# Patient Record
Sex: FEMALE | Race: WHITE | NOT HISPANIC OR LATINO | Employment: OTHER | URBAN - METROPOLITAN AREA
[De-identification: names, ages, dates, MRNs, and addresses within clinical notes are randomized per-mention and may not be internally consistent; named-entity substitution may affect disease eponyms.]

---

## 2023-06-05 LAB
ABO (EXTERNAL): NORMAL
C TRACH DNA SPEC QL PROBE+SIG AMP: NORMAL
GROUP B STREPTOCOCCUS (EXTERNAL): NEGATIVE
HEPATITIS B SURFACE ANTIGEN (EXTERNAL): NEGATIVE
HIV1+2 AB SERPL QL IA: NEGATIVE
N GONORRHOEA DNA SPEC QL PROBE+SIG AMP: NORMAL
RH (EXTERNAL): NEGATIVE
RUBELLA ANTIBODY IGG (EXTERNAL): NORMAL
SPECIMEN DESCRIP: NORMAL
SPECIMEN DESCRIPTION: NORMAL
TREPONEMA PALLIDUM ANTIBODY (EXTERNAL): NONREACTIVE
VDRL (SYPHILIS) (EXTERNAL): NONREACTIVE

## 2023-07-13 ENCOUNTER — HOSPITAL ENCOUNTER (INPATIENT)
Facility: CLINIC | Age: 23
LOS: 3 days | Discharge: HOME | End: 2023-07-16
Attending: FAMILY MEDICINE | Admitting: FAMILY MEDICINE
Payer: COMMERCIAL

## 2023-07-13 ENCOUNTER — TRANSFERRED RECORDS (OUTPATIENT)
Dept: HEALTH INFORMATION MANAGEMENT | Facility: CLINIC | Age: 23
End: 2023-07-13
Payer: COMMERCIAL

## 2023-07-13 DIAGNOSIS — D62 ANEMIA DUE TO BLOOD LOSS, ACUTE: ICD-10-CM

## 2023-07-13 DIAGNOSIS — Z98.891 S/P CESAREAN SECTION: ICD-10-CM

## 2023-07-13 DIAGNOSIS — O48.1 POST-TERM PREGNANCY BEYOND 42 WEEKS, ANTEPARTUM: ICD-10-CM

## 2023-07-13 PROBLEM — Z37.9 NORMAL LABOR: Status: ACTIVE | Noted: 2023-07-13

## 2023-07-13 LAB
ABO/RH(D): NORMAL
ANTIBODY SCREEN: NEGATIVE
HOLD SPECIMEN: NORMAL
SPECIMEN EXPIRATION DATE: NORMAL

## 2023-07-13 PROCEDURE — 86901 BLOOD TYPING SEROLOGIC RH(D): CPT | Performed by: FAMILY MEDICINE

## 2023-07-13 PROCEDURE — 86780 TREPONEMA PALLIDUM: CPT | Performed by: FAMILY MEDICINE

## 2023-07-13 PROCEDURE — 3E033VJ INTRODUCTION OF OTHER HORMONE INTO PERIPHERAL VEIN, PERCUTANEOUS APPROACH: ICD-10-PCS | Performed by: FAMILY MEDICINE

## 2023-07-13 PROCEDURE — 120N000001 HC R&B MED SURG/OB

## 2023-07-13 PROCEDURE — 85025 COMPLETE CBC W/AUTO DIFF WBC: CPT | Performed by: FAMILY MEDICINE

## 2023-07-13 PROCEDURE — 86850 RBC ANTIBODY SCREEN: CPT | Performed by: FAMILY MEDICINE

## 2023-07-13 RX ORDER — NALOXONE HYDROCHLORIDE 0.4 MG/ML
0.2 INJECTION, SOLUTION INTRAMUSCULAR; INTRAVENOUS; SUBCUTANEOUS
Status: DISCONTINUED | OUTPATIENT
Start: 2023-07-13 | End: 2023-07-14

## 2023-07-13 RX ORDER — OXYTOCIN/0.9 % SODIUM CHLORIDE 30/500 ML
1-24 PLASTIC BAG, INJECTION (ML) INTRAVENOUS CONTINUOUS
Status: DISCONTINUED | OUTPATIENT
Start: 2023-07-13 | End: 2023-07-14

## 2023-07-13 RX ORDER — METOCLOPRAMIDE 5 MG/1
10 TABLET ORAL EVERY 6 HOURS PRN
Status: DISCONTINUED | OUTPATIENT
Start: 2023-07-13 | End: 2023-07-14

## 2023-07-13 RX ORDER — SODIUM CHLORIDE, SODIUM LACTATE, POTASSIUM CHLORIDE, CALCIUM CHLORIDE 600; 310; 30; 20 MG/100ML; MG/100ML; MG/100ML; MG/100ML
INJECTION, SOLUTION INTRAVENOUS CONTINUOUS PRN
Status: DISCONTINUED | OUTPATIENT
Start: 2023-07-13 | End: 2023-07-14

## 2023-07-13 RX ORDER — KETOROLAC TROMETHAMINE 30 MG/ML
30 INJECTION, SOLUTION INTRAMUSCULAR; INTRAVENOUS
Status: DISCONTINUED | OUTPATIENT
Start: 2023-07-13 | End: 2023-07-14

## 2023-07-13 RX ORDER — ONDANSETRON 4 MG/1
4 TABLET, ORALLY DISINTEGRATING ORAL EVERY 6 HOURS PRN
Status: DISCONTINUED | OUTPATIENT
Start: 2023-07-13 | End: 2023-07-14

## 2023-07-13 RX ORDER — MISOPROSTOL 200 UG/1
400 TABLET ORAL
Status: DISCONTINUED | OUTPATIENT
Start: 2023-07-13 | End: 2023-07-14

## 2023-07-13 RX ORDER — PROCHLORPERAZINE 25 MG
25 SUPPOSITORY, RECTAL RECTAL EVERY 12 HOURS PRN
Status: DISCONTINUED | OUTPATIENT
Start: 2023-07-13 | End: 2023-07-14

## 2023-07-13 RX ORDER — TRANEXAMIC ACID 10 MG/ML
1 INJECTION, SOLUTION INTRAVENOUS EVERY 30 MIN PRN
Status: DISCONTINUED | OUTPATIENT
Start: 2023-07-13 | End: 2023-07-14

## 2023-07-13 RX ORDER — OXYTOCIN 10 [USP'U]/ML
10 INJECTION, SOLUTION INTRAMUSCULAR; INTRAVENOUS
Status: DISCONTINUED | OUTPATIENT
Start: 2023-07-13 | End: 2023-07-14

## 2023-07-13 RX ORDER — OXYTOCIN/0.9 % SODIUM CHLORIDE 30/500 ML
340 PLASTIC BAG, INJECTION (ML) INTRAVENOUS CONTINUOUS PRN
Status: DISCONTINUED | OUTPATIENT
Start: 2023-07-13 | End: 2023-07-14

## 2023-07-13 RX ORDER — ONDANSETRON 2 MG/ML
4 INJECTION INTRAMUSCULAR; INTRAVENOUS EVERY 6 HOURS PRN
Status: DISCONTINUED | OUTPATIENT
Start: 2023-07-13 | End: 2023-07-14

## 2023-07-13 RX ORDER — IBUPROFEN 800 MG/1
800 TABLET, FILM COATED ORAL
Status: DISCONTINUED | OUTPATIENT
Start: 2023-07-13 | End: 2023-07-14

## 2023-07-13 RX ORDER — METOCLOPRAMIDE HYDROCHLORIDE 5 MG/ML
10 INJECTION INTRAMUSCULAR; INTRAVENOUS EVERY 6 HOURS PRN
Status: DISCONTINUED | OUTPATIENT
Start: 2023-07-13 | End: 2023-07-14

## 2023-07-13 RX ORDER — METHYLERGONOVINE MALEATE 0.2 MG/ML
200 INJECTION INTRAVENOUS
Status: DISCONTINUED | OUTPATIENT
Start: 2023-07-13 | End: 2023-07-14

## 2023-07-13 RX ORDER — NALOXONE HYDROCHLORIDE 0.4 MG/ML
0.4 INJECTION, SOLUTION INTRAMUSCULAR; INTRAVENOUS; SUBCUTANEOUS
Status: DISCONTINUED | OUTPATIENT
Start: 2023-07-13 | End: 2023-07-14

## 2023-07-13 RX ORDER — PROCHLORPERAZINE MALEATE 5 MG
10 TABLET ORAL EVERY 6 HOURS PRN
Status: DISCONTINUED | OUTPATIENT
Start: 2023-07-13 | End: 2023-07-14

## 2023-07-13 RX ORDER — CITRIC ACID/SODIUM CITRATE 334-500MG
30 SOLUTION, ORAL ORAL
Status: COMPLETED | OUTPATIENT
Start: 2023-07-13 | End: 2023-07-14

## 2023-07-13 RX ORDER — OXYTOCIN/0.9 % SODIUM CHLORIDE 30/500 ML
100-340 PLASTIC BAG, INJECTION (ML) INTRAVENOUS CONTINUOUS PRN
Status: DISCONTINUED | OUTPATIENT
Start: 2023-07-13 | End: 2023-07-14

## 2023-07-13 RX ORDER — LIDOCAINE 40 MG/G
CREAM TOPICAL
Status: DISCONTINUED | OUTPATIENT
Start: 2023-07-13 | End: 2023-07-14

## 2023-07-13 RX ORDER — CARBOPROST TROMETHAMINE 250 UG/ML
250 INJECTION, SOLUTION INTRAMUSCULAR
Status: DISCONTINUED | OUTPATIENT
Start: 2023-07-13 | End: 2023-07-14

## 2023-07-13 ASSESSMENT — ACTIVITIES OF DAILY LIVING (ADL)
TOILETING_ISSUES: NO
WALKING_OR_CLIMBING_STAIRS_DIFFICULTY: NO
WEAR_GLASSES_OR_BLIND: NO
DRESSING/BATHING_DIFFICULTY: NO
ADLS_ACUITY_SCORE: 35
CONCENTRATING,_REMEMBERING_OR_MAKING_DECISIONS_DIFFICULTY: NO
DOING_ERRANDS_INDEPENDENTLY_DIFFICULTY: YES
ADLS_ACUITY_SCORE: 20
CHANGE_IN_FUNCTIONAL_STATUS_SINCE_ONSET_OF_CURRENT_ILLNESS/INJURY: NO
DIFFICULTY_EATING/SWALLOWING: NO
FALL_HISTORY_WITHIN_LAST_SIX_MONTHS: NO
ADLS_ACUITY_SCORE: 20

## 2023-07-14 ENCOUNTER — ANESTHESIA (OUTPATIENT)
Dept: OBGYN | Facility: CLINIC | Age: 23
End: 2023-07-14
Payer: COMMERCIAL

## 2023-07-14 ENCOUNTER — MEDICAL CORRESPONDENCE (OUTPATIENT)
Dept: HEALTH INFORMATION MANAGEMENT | Facility: CLINIC | Age: 23
End: 2023-07-14

## 2023-07-14 ENCOUNTER — ANESTHESIA EVENT (OUTPATIENT)
Dept: OBGYN | Facility: CLINIC | Age: 23
End: 2023-07-14
Payer: COMMERCIAL

## 2023-07-14 LAB
BASOPHILS # BLD AUTO: 0.1 10E3/UL (ref 0–0.2)
BASOPHILS NFR BLD AUTO: 0 %
EOSINOPHIL # BLD AUTO: 0.1 10E3/UL (ref 0–0.7)
EOSINOPHIL NFR BLD AUTO: 1 %
ERYTHROCYTE [DISTWIDTH] IN BLOOD BY AUTOMATED COUNT: 14.6 % (ref 10–15)
HCT VFR BLD AUTO: 35.2 % (ref 35–47)
HGB BLD-MCNC: 11.5 G/DL (ref 11.7–15.7)
IMM GRANULOCYTES # BLD: 0.2 10E3/UL
IMM GRANULOCYTES NFR BLD: 2 %
LYMPHOCYTES # BLD AUTO: 2.1 10E3/UL (ref 0.8–5.3)
LYMPHOCYTES NFR BLD AUTO: 13 %
MCH RBC QN AUTO: 27.9 PG (ref 26.5–33)
MCHC RBC AUTO-ENTMCNC: 32.7 G/DL (ref 31.5–36.5)
MCV RBC AUTO: 85 FL (ref 78–100)
MONOCYTES # BLD AUTO: 1 10E3/UL (ref 0–1.3)
MONOCYTES NFR BLD AUTO: 6 %
NEUTROPHILS # BLD AUTO: 12.9 10E3/UL (ref 1.6–8.3)
NEUTROPHILS NFR BLD AUTO: 78 %
NRBC # BLD AUTO: 0 10E3/UL
NRBC BLD AUTO-RTO: 0 /100
PLATELET # BLD AUTO: 295 10E3/UL (ref 150–450)
RBC # BLD AUTO: 4.12 10E6/UL (ref 3.8–5.2)
T PALLIDUM AB SER QL: NONREACTIVE
WBC # BLD AUTO: 16.4 10E3/UL (ref 4–11)

## 2023-07-14 PROCEDURE — 272N000001 HC OR GENERAL SUPPLY STERILE: Performed by: FAMILY MEDICINE

## 2023-07-14 PROCEDURE — 250N000009 HC RX 250: Performed by: FAMILY MEDICINE

## 2023-07-14 PROCEDURE — 250N000011 HC RX IP 250 OP 636: Performed by: NURSE ANESTHETIST, CERTIFIED REGISTERED

## 2023-07-14 PROCEDURE — 258N000003 HC RX IP 258 OP 636: Performed by: FAMILY MEDICINE

## 2023-07-14 PROCEDURE — C9290 INJ, BUPIVACAINE LIPOSOME: HCPCS | Performed by: NURSE ANESTHETIST, CERTIFIED REGISTERED

## 2023-07-14 PROCEDURE — 250N000009 HC RX 250: Performed by: NURSE ANESTHETIST, CERTIFIED REGISTERED

## 2023-07-14 PROCEDURE — 120N000001 HC R&B MED SURG/OB

## 2023-07-14 PROCEDURE — 250N000011 HC RX IP 250 OP 636: Mod: JZ | Performed by: FAMILY MEDICINE

## 2023-07-14 PROCEDURE — 250N000011 HC RX IP 250 OP 636: Mod: JZ | Performed by: NURSE ANESTHETIST, CERTIFIED REGISTERED

## 2023-07-14 PROCEDURE — 370N000017 HC ANESTHESIA TECHNICAL FEE, PER MIN: Performed by: FAMILY MEDICINE

## 2023-07-14 PROCEDURE — 59514 CESAREAN DELIVERY ONLY: CPT | Performed by: FAMILY MEDICINE

## 2023-07-14 PROCEDURE — 271N000001 HC OR GENERAL SUPPLY NON-STERILE: Performed by: FAMILY MEDICINE

## 2023-07-14 PROCEDURE — 250N000011 HC RX IP 250 OP 636: Performed by: FAMILY MEDICINE

## 2023-07-14 PROCEDURE — 360N000076 HC SURGERY LEVEL 3, PER MIN: Performed by: FAMILY MEDICINE

## 2023-07-14 PROCEDURE — 250N000013 HC RX MED GY IP 250 OP 250 PS 637: Performed by: FAMILY MEDICINE

## 2023-07-14 PROCEDURE — 710N000010 HC RECOVERY PHASE 1, LEVEL 2, PER MIN: Performed by: FAMILY MEDICINE

## 2023-07-14 RX ORDER — MORPHINE SULFATE 1 MG/ML
INJECTION, SOLUTION EPIDURAL; INTRATHECAL; INTRAVENOUS PRN
Status: DISCONTINUED | OUTPATIENT
Start: 2023-07-14 | End: 2023-07-14

## 2023-07-14 RX ORDER — ACETAMINOPHEN 325 MG/1
975 TABLET ORAL ONCE
Status: COMPLETED | OUTPATIENT
Start: 2023-07-14 | End: 2023-07-14

## 2023-07-14 RX ORDER — ONDANSETRON 2 MG/ML
4 INJECTION INTRAMUSCULAR; INTRAVENOUS EVERY 30 MIN PRN
Status: DISCONTINUED | OUTPATIENT
Start: 2023-07-14 | End: 2023-07-14

## 2023-07-14 RX ORDER — NALOXONE HYDROCHLORIDE 0.4 MG/ML
0.4 INJECTION, SOLUTION INTRAMUSCULAR; INTRAVENOUS; SUBCUTANEOUS
Status: DISCONTINUED | OUTPATIENT
Start: 2023-07-14 | End: 2023-07-16 | Stop reason: HOSPADM

## 2023-07-14 RX ORDER — LIDOCAINE HYDROCHLORIDE 20 MG/ML
INJECTION, SOLUTION INFILTRATION; PERINEURAL PRN
Status: DISCONTINUED | OUTPATIENT
Start: 2023-07-14 | End: 2023-07-14

## 2023-07-14 RX ORDER — FENTANYL CITRATE 50 UG/ML
25 INJECTION, SOLUTION INTRAMUSCULAR; INTRAVENOUS EVERY 5 MIN PRN
Status: DISCONTINUED | OUTPATIENT
Start: 2023-07-14 | End: 2023-07-16 | Stop reason: HOSPADM

## 2023-07-14 RX ORDER — BUPIVACAINE HYDROCHLORIDE 7.5 MG/ML
INJECTION, SOLUTION INTRASPINAL PRN
Status: DISCONTINUED | OUTPATIENT
Start: 2023-07-14 | End: 2023-07-14

## 2023-07-14 RX ORDER — BISACODYL 10 MG
10 SUPPOSITORY, RECTAL RECTAL DAILY PRN
Status: DISCONTINUED | OUTPATIENT
Start: 2023-07-16 | End: 2023-07-16 | Stop reason: HOSPADM

## 2023-07-14 RX ORDER — NALOXONE HYDROCHLORIDE 0.4 MG/ML
0.2 INJECTION, SOLUTION INTRAMUSCULAR; INTRAVENOUS; SUBCUTANEOUS
Status: DISCONTINUED | OUTPATIENT
Start: 2023-07-14 | End: 2023-07-16 | Stop reason: HOSPADM

## 2023-07-14 RX ORDER — ACETAMINOPHEN 325 MG/1
975 TABLET ORAL EVERY 6 HOURS
Status: DISCONTINUED | OUTPATIENT
Start: 2023-07-14 | End: 2023-07-14

## 2023-07-14 RX ORDER — ONDANSETRON 4 MG/1
4 TABLET, ORALLY DISINTEGRATING ORAL EVERY 6 HOURS PRN
Status: DISCONTINUED | OUTPATIENT
Start: 2023-07-14 | End: 2023-07-16 | Stop reason: HOSPADM

## 2023-07-14 RX ORDER — LIDOCAINE HYDROCHLORIDE 10 MG/ML
INJECTION, SOLUTION INFILTRATION; PERINEURAL PRN
Status: DISCONTINUED | OUTPATIENT
Start: 2023-07-14 | End: 2023-07-14

## 2023-07-14 RX ORDER — METHYLERGONOVINE MALEATE 0.2 MG/ML
200 INJECTION INTRAVENOUS
Status: DISCONTINUED | OUTPATIENT
Start: 2023-07-14 | End: 2023-07-16 | Stop reason: HOSPADM

## 2023-07-14 RX ORDER — CITRIC ACID/SODIUM CITRATE 334-500MG
30 SOLUTION, ORAL ORAL
Status: COMPLETED | OUTPATIENT
Start: 2023-07-14 | End: 2023-07-14

## 2023-07-14 RX ORDER — FENTANYL CITRATE-0.9 % NACL/PF 10 MCG/ML
PLASTIC BAG, INJECTION (ML) INTRAVENOUS CONTINUOUS PRN
Status: DISCONTINUED | OUTPATIENT
Start: 2023-07-14 | End: 2023-07-14

## 2023-07-14 RX ORDER — EPHEDRINE SULFATE 50 MG/ML
5 INJECTION, SOLUTION INTRAMUSCULAR; INTRAVENOUS; SUBCUTANEOUS
Status: DISCONTINUED | OUTPATIENT
Start: 2023-07-14 | End: 2023-07-14

## 2023-07-14 RX ORDER — OXYTOCIN/0.9 % SODIUM CHLORIDE 30/500 ML
340 PLASTIC BAG, INJECTION (ML) INTRAVENOUS CONTINUOUS PRN
Status: DISCONTINUED | OUTPATIENT
Start: 2023-07-14 | End: 2023-07-14

## 2023-07-14 RX ORDER — ONDANSETRON 2 MG/ML
4 INJECTION INTRAMUSCULAR; INTRAVENOUS EVERY 6 HOURS PRN
Status: DISCONTINUED | OUTPATIENT
Start: 2023-07-14 | End: 2023-07-16 | Stop reason: HOSPADM

## 2023-07-14 RX ORDER — DIPHENHYDRAMINE HCL 25 MG
25 CAPSULE ORAL EVERY 6 HOURS PRN
Status: DISCONTINUED | OUTPATIENT
Start: 2023-07-14 | End: 2023-07-16 | Stop reason: HOSPADM

## 2023-07-14 RX ORDER — AZITHROMYCIN 500 MG/1
500 INJECTION, POWDER, LYOPHILIZED, FOR SOLUTION INTRAVENOUS
Status: COMPLETED | OUTPATIENT
Start: 2023-07-14 | End: 2023-07-14

## 2023-07-14 RX ORDER — PROCHLORPERAZINE 25 MG
25 SUPPOSITORY, RECTAL RECTAL EVERY 12 HOURS PRN
Status: DISCONTINUED | OUTPATIENT
Start: 2023-07-14 | End: 2023-07-16 | Stop reason: HOSPADM

## 2023-07-14 RX ORDER — SIMETHICONE 80 MG
80 TABLET,CHEWABLE ORAL 4 TIMES DAILY PRN
Status: DISCONTINUED | OUTPATIENT
Start: 2023-07-14 | End: 2023-07-16 | Stop reason: HOSPADM

## 2023-07-14 RX ORDER — ONDANSETRON 2 MG/ML
INJECTION INTRAMUSCULAR; INTRAVENOUS PRN
Status: DISCONTINUED | OUTPATIENT
Start: 2023-07-14 | End: 2023-07-14

## 2023-07-14 RX ORDER — SODIUM CHLORIDE, SODIUM LACTATE, POTASSIUM CHLORIDE, CALCIUM CHLORIDE 600; 310; 30; 20 MG/100ML; MG/100ML; MG/100ML; MG/100ML
INJECTION, SOLUTION INTRAVENOUS CONTINUOUS
Status: DISCONTINUED | OUTPATIENT
Start: 2023-07-14 | End: 2023-07-14

## 2023-07-14 RX ORDER — ALBUTEROL SULFATE 0.83 MG/ML
2.5 SOLUTION RESPIRATORY (INHALATION) EVERY 4 HOURS PRN
Status: DISCONTINUED | OUTPATIENT
Start: 2023-07-14 | End: 2023-07-16 | Stop reason: HOSPADM

## 2023-07-14 RX ORDER — CARBOPROST TROMETHAMINE 250 UG/ML
250 INJECTION, SOLUTION INTRAMUSCULAR
Status: DISCONTINUED | OUTPATIENT
Start: 2023-07-14 | End: 2023-07-14

## 2023-07-14 RX ORDER — BUPIVACAINE HYDROCHLORIDE 2.5 MG/ML
INJECTION, SOLUTION EPIDURAL; INFILTRATION; INTRACAUDAL PRN
Status: DISCONTINUED | OUTPATIENT
Start: 2023-07-14 | End: 2023-07-14

## 2023-07-14 RX ORDER — DEXAMETHASONE SODIUM PHOSPHATE 10 MG/ML
INJECTION, SOLUTION INTRAMUSCULAR; INTRAVENOUS PRN
Status: DISCONTINUED | OUTPATIENT
Start: 2023-07-14 | End: 2023-07-14

## 2023-07-14 RX ORDER — OXYTOCIN 10 [USP'U]/ML
INJECTION, SOLUTION INTRAMUSCULAR; INTRAVENOUS PRN
Status: DISCONTINUED | OUTPATIENT
Start: 2023-07-14 | End: 2023-07-16 | Stop reason: HOSPADM

## 2023-07-14 RX ORDER — OXYTOCIN/0.9 % SODIUM CHLORIDE 30/500 ML
100-340 PLASTIC BAG, INJECTION (ML) INTRAVENOUS CONTINUOUS PRN
Status: DISCONTINUED | OUTPATIENT
Start: 2023-07-14 | End: 2023-07-16 | Stop reason: HOSPADM

## 2023-07-14 RX ORDER — LIDOCAINE 40 MG/G
CREAM TOPICAL
Status: DISCONTINUED | OUTPATIENT
Start: 2023-07-14 | End: 2023-07-16 | Stop reason: HOSPADM

## 2023-07-14 RX ORDER — CEFAZOLIN SODIUM/WATER 2 G/20 ML
2 SYRINGE (ML) INTRAVENOUS
Status: DISCONTINUED | OUTPATIENT
Start: 2023-07-14 | End: 2023-07-14

## 2023-07-14 RX ORDER — TRANEXAMIC ACID 10 MG/ML
1 INJECTION, SOLUTION INTRAVENOUS EVERY 30 MIN PRN
Status: DISCONTINUED | OUTPATIENT
Start: 2023-07-14 | End: 2023-07-16 | Stop reason: HOSPADM

## 2023-07-14 RX ORDER — AMOXICILLIN 250 MG
2 CAPSULE ORAL 2 TIMES DAILY
Status: DISCONTINUED | OUTPATIENT
Start: 2023-07-14 | End: 2023-07-16 | Stop reason: HOSPADM

## 2023-07-14 RX ORDER — DEXTROSE, SODIUM CHLORIDE, SODIUM LACTATE, POTASSIUM CHLORIDE, AND CALCIUM CHLORIDE 5; .6; .31; .03; .02 G/100ML; G/100ML; G/100ML; G/100ML; G/100ML
INJECTION, SOLUTION INTRAVENOUS CONTINUOUS
Status: DISCONTINUED | OUTPATIENT
Start: 2023-07-14 | End: 2023-07-16 | Stop reason: HOSPADM

## 2023-07-14 RX ORDER — PROCHLORPERAZINE MALEATE 5 MG
10 TABLET ORAL EVERY 6 HOURS PRN
Status: DISCONTINUED | OUTPATIENT
Start: 2023-07-14 | End: 2023-07-16 | Stop reason: HOSPADM

## 2023-07-14 RX ORDER — NALBUPHINE HYDROCHLORIDE 10 MG/ML
2.5-5 INJECTION, SOLUTION INTRAMUSCULAR; INTRAVENOUS; SUBCUTANEOUS EVERY 6 HOURS PRN
Status: DISCONTINUED | OUTPATIENT
Start: 2023-07-14 | End: 2023-07-14

## 2023-07-14 RX ORDER — FENTANYL CITRATE 50 UG/ML
50 INJECTION, SOLUTION INTRAMUSCULAR; INTRAVENOUS EVERY 5 MIN PRN
Status: DISCONTINUED | OUTPATIENT
Start: 2023-07-14 | End: 2023-07-16 | Stop reason: HOSPADM

## 2023-07-14 RX ORDER — CEFAZOLIN SODIUM/WATER 2 G/20 ML
2 SYRINGE (ML) INTRAVENOUS SEE ADMIN INSTRUCTIONS
Status: DISCONTINUED | OUTPATIENT
Start: 2023-07-14 | End: 2023-07-14

## 2023-07-14 RX ORDER — CARBOPROST TROMETHAMINE 250 UG/ML
250 INJECTION, SOLUTION INTRAMUSCULAR
Status: DISCONTINUED | OUTPATIENT
Start: 2023-07-14 | End: 2023-07-16 | Stop reason: HOSPADM

## 2023-07-14 RX ORDER — LIDOCAINE HYDROCHLORIDE AND EPINEPHRINE 15; 5 MG/ML; UG/ML
INJECTION, SOLUTION EPIDURAL PRN
Status: DISCONTINUED | OUTPATIENT
Start: 2023-07-14 | End: 2023-07-14

## 2023-07-14 RX ORDER — FENTANYL CITRATE 50 UG/ML
INJECTION, SOLUTION INTRAMUSCULAR; INTRAVENOUS PRN
Status: DISCONTINUED | OUTPATIENT
Start: 2023-07-14 | End: 2023-07-14

## 2023-07-14 RX ORDER — METHYLERGONOVINE MALEATE 0.2 MG/ML
200 INJECTION INTRAVENOUS
Status: DISCONTINUED | OUTPATIENT
Start: 2023-07-14 | End: 2023-07-14

## 2023-07-14 RX ORDER — DIPHENHYDRAMINE HYDROCHLORIDE 50 MG/ML
25 INJECTION INTRAMUSCULAR; INTRAVENOUS EVERY 6 HOURS PRN
Status: DISCONTINUED | OUTPATIENT
Start: 2023-07-14 | End: 2023-07-16 | Stop reason: HOSPADM

## 2023-07-14 RX ORDER — METOCLOPRAMIDE 5 MG/1
10 TABLET ORAL EVERY 6 HOURS PRN
Status: DISCONTINUED | OUTPATIENT
Start: 2023-07-14 | End: 2023-07-16 | Stop reason: HOSPADM

## 2023-07-14 RX ORDER — LIDOCAINE 40 MG/G
CREAM TOPICAL
Status: DISCONTINUED | OUTPATIENT
Start: 2023-07-14 | End: 2023-07-14

## 2023-07-14 RX ORDER — OXYCODONE HYDROCHLORIDE 5 MG/1
5 TABLET ORAL EVERY 4 HOURS PRN
Status: DISCONTINUED | OUTPATIENT
Start: 2023-07-14 | End: 2023-07-16 | Stop reason: HOSPADM

## 2023-07-14 RX ORDER — HYDROCORTISONE 25 MG/G
CREAM TOPICAL 3 TIMES DAILY PRN
Status: DISCONTINUED | OUTPATIENT
Start: 2023-07-14 | End: 2023-07-16 | Stop reason: HOSPADM

## 2023-07-14 RX ORDER — OXYTOCIN/0.9 % SODIUM CHLORIDE 30/500 ML
340 PLASTIC BAG, INJECTION (ML) INTRAVENOUS CONTINUOUS PRN
Status: DISCONTINUED | OUTPATIENT
Start: 2023-07-14 | End: 2023-07-16 | Stop reason: HOSPADM

## 2023-07-14 RX ORDER — OXYTOCIN 10 [USP'U]/ML
10 INJECTION, SOLUTION INTRAMUSCULAR; INTRAVENOUS
Status: DISCONTINUED | OUTPATIENT
Start: 2023-07-14 | End: 2023-07-16 | Stop reason: HOSPADM

## 2023-07-14 RX ORDER — MISOPROSTOL 200 UG/1
400 TABLET ORAL
Status: DISCONTINUED | OUTPATIENT
Start: 2023-07-14 | End: 2023-07-16 | Stop reason: HOSPADM

## 2023-07-14 RX ORDER — KETOROLAC TROMETHAMINE 30 MG/ML
30 INJECTION, SOLUTION INTRAMUSCULAR; INTRAVENOUS EVERY 6 HOURS
Status: COMPLETED | OUTPATIENT
Start: 2023-07-14 | End: 2023-07-15

## 2023-07-14 RX ORDER — PROPOFOL 10 MG/ML
INJECTION, EMULSION INTRAVENOUS PRN
Status: DISCONTINUED | OUTPATIENT
Start: 2023-07-14 | End: 2023-07-14

## 2023-07-14 RX ORDER — TRANEXAMIC ACID 10 MG/ML
1 INJECTION, SOLUTION INTRAVENOUS EVERY 30 MIN PRN
Status: DISCONTINUED | OUTPATIENT
Start: 2023-07-14 | End: 2023-07-14

## 2023-07-14 RX ORDER — OXYTOCIN 10 [USP'U]/ML
10 INJECTION, SOLUTION INTRAMUSCULAR; INTRAVENOUS
Status: DISCONTINUED | OUTPATIENT
Start: 2023-07-14 | End: 2023-07-14

## 2023-07-14 RX ORDER — OXYTOCIN/0.9 % SODIUM CHLORIDE 30/500 ML
PLASTIC BAG, INJECTION (ML) INTRAVENOUS CONTINUOUS PRN
Status: DISCONTINUED | OUTPATIENT
Start: 2023-07-14 | End: 2023-07-14

## 2023-07-14 RX ORDER — ONDANSETRON 4 MG/1
4 TABLET, ORALLY DISINTEGRATING ORAL EVERY 30 MIN PRN
Status: DISCONTINUED | OUTPATIENT
Start: 2023-07-14 | End: 2023-07-14

## 2023-07-14 RX ORDER — MODIFIED LANOLIN
OINTMENT (GRAM) TOPICAL
Status: DISCONTINUED | OUTPATIENT
Start: 2023-07-14 | End: 2023-07-16 | Stop reason: HOSPADM

## 2023-07-14 RX ORDER — IBUPROFEN 800 MG/1
800 TABLET, FILM COATED ORAL EVERY 6 HOURS
Status: DISCONTINUED | OUTPATIENT
Start: 2023-07-15 | End: 2023-07-16 | Stop reason: HOSPADM

## 2023-07-14 RX ORDER — ACETAMINOPHEN 325 MG/1
975 TABLET ORAL EVERY 6 HOURS
Status: DISCONTINUED | OUTPATIENT
Start: 2023-07-14 | End: 2023-07-16 | Stop reason: HOSPADM

## 2023-07-14 RX ORDER — MISOPROSTOL 200 UG/1
400 TABLET ORAL
Status: DISCONTINUED | OUTPATIENT
Start: 2023-07-14 | End: 2023-07-14

## 2023-07-14 RX ORDER — METOCLOPRAMIDE HYDROCHLORIDE 5 MG/ML
10 INJECTION INTRAMUSCULAR; INTRAVENOUS EVERY 6 HOURS PRN
Status: DISCONTINUED | OUTPATIENT
Start: 2023-07-14 | End: 2023-07-16 | Stop reason: HOSPADM

## 2023-07-14 RX ORDER — AMOXICILLIN 250 MG
1 CAPSULE ORAL 2 TIMES DAILY
Status: DISCONTINUED | OUTPATIENT
Start: 2023-07-14 | End: 2023-07-16 | Stop reason: HOSPADM

## 2023-07-14 RX ADMIN — BUPIVACAINE 5 ML: 13.3 INJECTION, SUSPENSION, LIPOSOMAL INFILTRATION at 12:31

## 2023-07-14 RX ADMIN — BUPIVACAINE 5 ML: 13.3 INJECTION, SUSPENSION, LIPOSOMAL INFILTRATION at 12:34

## 2023-07-14 RX ADMIN — ACETAMINOPHEN 975 MG: 325 TABLET, FILM COATED ORAL at 10:55

## 2023-07-14 RX ADMIN — MORPHINE SULFATE 0.2 MG: 1 INJECTION EPIDURAL; INTRATHECAL; INTRAVENOUS at 11:25

## 2023-07-14 RX ADMIN — Medication: at 02:00

## 2023-07-14 RX ADMIN — SENNOSIDES AND DOCUSATE SODIUM 1 TABLET: 8.6; 5 TABLET ORAL at 20:08

## 2023-07-14 RX ADMIN — KETOROLAC TROMETHAMINE 30 MG: 30 INJECTION, SOLUTION INTRAMUSCULAR; INTRAVENOUS at 20:08

## 2023-07-14 RX ADMIN — ONDANSETRON 4 MG: 2 INJECTION INTRAMUSCULAR; INTRAVENOUS at 11:42

## 2023-07-14 RX ADMIN — KETOROLAC TROMETHAMINE 30 MG: 30 INJECTION, SOLUTION INTRAMUSCULAR; INTRAVENOUS at 13:55

## 2023-07-14 RX ADMIN — SODIUM CHLORIDE, SODIUM LACTATE, POTASSIUM CHLORIDE, CALCIUM CHLORIDE AND DEXTROSE MONOHYDRATE: 5; 600; 310; 30; 20 INJECTION, SOLUTION INTRAVENOUS at 15:28

## 2023-07-14 RX ADMIN — PROPOFOL 30 MG: 10 INJECTION, EMULSION INTRAVENOUS at 11:43

## 2023-07-14 RX ADMIN — ACETAMINOPHEN 975 MG: 325 TABLET, FILM COATED ORAL at 23:11

## 2023-07-14 RX ADMIN — BUPIVACAINE HYDROCHLORIDE 8 ML: 2.5 INJECTION, SOLUTION EPIDURAL; INFILTRATION; INTRACAUDAL; PERINEURAL at 01:55

## 2023-07-14 RX ADMIN — LIDOCAINE HYDROCHLORIDE 10 ML: 20 INJECTION, SOLUTION INFILTRATION; PERINEURAL at 11:43

## 2023-07-14 RX ADMIN — LIDOCAINE HYDROCHLORIDE,EPINEPHRINE BITARTRATE 3 ML: 15; .005 INJECTION, SOLUTION EPIDURAL; INFILTRATION; INTRACAUDAL; PERINEURAL at 01:52

## 2023-07-14 RX ADMIN — DEXAMETHASONE SODIUM PHOSPHATE 5 MG: 10 INJECTION, SOLUTION INTRAMUSCULAR; INTRAVENOUS at 12:30

## 2023-07-14 RX ADMIN — BUPIVACAINE HYDROCHLORIDE 7 ML: 2.5 INJECTION, SOLUTION EPIDURAL; INFILTRATION; INTRACAUDAL at 12:30

## 2023-07-14 RX ADMIN — AZITHROMYCIN MONOHYDRATE 500 MG: 500 INJECTION, POWDER, LYOPHILIZED, FOR SOLUTION INTRAVENOUS at 10:45

## 2023-07-14 RX ADMIN — DEXAMETHASONE SODIUM PHOSPHATE 5 MG: 10 INJECTION, SOLUTION INTRAMUSCULAR; INTRAVENOUS at 12:34

## 2023-07-14 RX ADMIN — Medication: at 07:33

## 2023-07-14 RX ADMIN — BUPIVACAINE HYDROCHLORIDE 4 ML: 2.5 INJECTION, SOLUTION EPIDURAL; INFILTRATION; INTRACAUDAL; PERINEURAL at 02:07

## 2023-07-14 RX ADMIN — BUPIVACAINE HYDROCHLORIDE 7 ML: 2.5 INJECTION, SOLUTION EPIDURAL; INFILTRATION; INTRACAUDAL at 12:33

## 2023-07-14 RX ADMIN — DEXAMETHASONE SODIUM PHOSPHATE 5 MG: 10 INJECTION, SOLUTION INTRAMUSCULAR; INTRAVENOUS at 12:29

## 2023-07-14 RX ADMIN — SODIUM CHLORIDE, POTASSIUM CHLORIDE, SODIUM LACTATE AND CALCIUM CHLORIDE: 600; 310; 30; 20 INJECTION, SOLUTION INTRAVENOUS at 02:07

## 2023-07-14 RX ADMIN — Medication 2 MILLI-UNITS/MIN: at 05:18

## 2023-07-14 RX ADMIN — SODIUM CHLORIDE, POTASSIUM CHLORIDE, SODIUM LACTATE AND CALCIUM CHLORIDE 1000 ML: 600; 310; 30; 20 INJECTION, SOLUTION INTRAVENOUS at 01:07

## 2023-07-14 RX ADMIN — DEXAMETHASONE SODIUM PHOSPHATE 5 MG: 10 INJECTION, SOLUTION INTRAMUSCULAR; INTRAVENOUS at 12:33

## 2023-07-14 RX ADMIN — BUPIVACAINE 5 ML: 13.3 INJECTION, SUSPENSION, LIPOSOMAL INFILTRATION at 12:33

## 2023-07-14 RX ADMIN — BUPIVACAINE HYDROCHLORIDE IN DEXTROSE 1.5 ML: 7.5 INJECTION, SOLUTION SUBARACHNOID at 11:25

## 2023-07-14 RX ADMIN — SODIUM CITRATE AND CITRIC ACID MONOHYDRATE 30 ML: 500; 334 SOLUTION ORAL at 10:55

## 2023-07-14 RX ADMIN — BUPIVACAINE HYDROCHLORIDE 8 ML: 2.5 INJECTION, SOLUTION EPIDURAL; INFILTRATION; INTRACAUDAL at 12:29

## 2023-07-14 RX ADMIN — LIDOCAINE HYDROCHLORIDE 10 ML: 10 INJECTION, SOLUTION INFILTRATION; PERINEURAL at 10:51

## 2023-07-14 RX ADMIN — Medication 340 ML/HR: at 11:48

## 2023-07-14 RX ADMIN — SODIUM CHLORIDE, POTASSIUM CHLORIDE, SODIUM LACTATE AND CALCIUM CHLORIDE 500 ML: 600; 310; 30; 20 INJECTION, SOLUTION INTRAVENOUS at 10:45

## 2023-07-14 RX ADMIN — ACETAMINOPHEN 975 MG: 325 TABLET, FILM COATED ORAL at 17:50

## 2023-07-14 RX ADMIN — Medication 2 G: at 11:15

## 2023-07-14 RX ADMIN — Medication 50 MCG/MIN: at 11:25

## 2023-07-14 RX ADMIN — BUPIVACAINE 5 ML: 13.3 INJECTION, SUSPENSION, LIPOSOMAL INFILTRATION at 12:30

## 2023-07-14 RX ADMIN — BUPIVACAINE HYDROCHLORIDE 8 ML: 2.5 INJECTION, SOLUTION EPIDURAL; INFILTRATION; INTRACAUDAL at 12:34

## 2023-07-14 RX ADMIN — FENTANYL CITRATE 15 MCG: 50 INJECTION, SOLUTION INTRAMUSCULAR; INTRAVENOUS at 11:25

## 2023-07-14 RX ADMIN — SODIUM CITRATE AND CITRIC ACID MONOHYDRATE 30 ML: 500; 334 SOLUTION ORAL at 11:00

## 2023-07-14 RX ADMIN — LIDOCAINE HYDROCHLORIDE 5 ML: 20 INJECTION, SOLUTION INFILTRATION; PERINEURAL at 02:27

## 2023-07-14 ASSESSMENT — ACTIVITIES OF DAILY LIVING (ADL)
ADLS_ACUITY_SCORE: 20
ADLS_ACUITY_SCORE: 23
ADLS_ACUITY_SCORE: 20

## 2023-07-14 NOTE — PROGRESS NOTES
"Progress note:  Subjective: Patient has been on Pitocin since 5 AM without any significant cervical change.  She had a max dose of 10 milliunits/min continuous drip and that was back down to 8 milliunits/min continuous drip due to hyperstimulation.  Dr. Mac had checked her at 4 AM this morning and she was 9 cm with very slow progression.  She was 6 cm when she appeared scented staff had checked her at 8 PM on July 13.  Nursing staff checked her at 715 this morning and she had a thick anterior rim at a -2 station.  Dr. Mac had thought that the baby was over a just due to the weight was filling the pelvis but was not certain why the labor progress was so slow.  Fetal heart rate has been in the 140s with moderate variability and nice accelerations, category 1 monitor strip.    Objective:  /84   Pulse 96   Temp 97.6  F (36.4  C) (Oral)   Resp 16   Ht 1.651 m (5' 5\")   Wt 88 kg (194 lb)   LMP 10/10/2022 (Approximate)   SpO2 93%   BMI 32.28 kg/m      Exam: Cervical exam shows her to be still with a fairly tight anterior lip from the 10 o'clock position all the way over to the 3 o'clock position.  The baby had dropped to a 0 to +1 station with contractions but it was very tight cervix so I was unable to to reduce it over the baby's head.  With trying to reduce the cervix the baby had a significant deceleration into the 90s that lasted for 3 minutes.  With the next contraction I tried the same thing and again had a deceleration into the upper 90s to low 100s for 2 to 3 minutes.  Since that was attempted, the strip has changed.  She is having some variable and even slightly late decelerations after each contraction, fetal heart rate is in the 140s with minimal variability.  This is now category 2 monitor strip.    Assessment:  23-year-old primiparous patient at 42-1/7 weeks gestation with a prolonged second phase of first stage of labor now stuck with an anterior rim for more than 3 hours.    Fetal " heart rate is changed going from a category 1 strip to category 2 with minimal variability and recurrent variable D cells with some late in timing.    Plan:  I spoke with the couple and her family about the risk of continuing trying to labor her.  She has had a very prolonged active phase of labor with minimal change so I am concerned that there is some sort of dystocia with either pelvis being too small or fetus being too big.  I did do an ultrasound at the bedside which showed the baby's spine on maternal left so the baby is in an old a presentation with the occiput on maternal left so CELESTE.    I discussed primary  as mode of delivery and the patient couple are agreeable to this.  They do want to do some delayed cord clamping for at least 60 to 90 seconds on the operating room table.  If baby is stable we can then bring the baby to the warmer dried off and then bring baby over to mom for mom to hold the baby  Once we are ready to do the tap block for her pain management after the  is completed we can take baby with dad to the recovery room where they can do some Koala care there with skin to skin.    Risks of the  have been discussed already with Dr. Mac including blood loss, infection, injury to bowel bladder and other internal, or risk of injury to the baby.  Average blood loss with a primary  is between 500 to 1000 cc.  If hemorrhage happens and there is need blood products the risk for tainted blood is very minimal.  I will consent her for the  and blood products.    Electronically signed by:  Isaiah Castañeda M.D.  2023

## 2023-07-14 NOTE — PROGRESS NOTES
"RN accepted care at 0700. Patient uncomfortable and feeling \"pushy\", SVE requested by patient. SVE 9.5/100%/-2. RN discussed position changes and patient open to ideas. Side lying release done on right and left side. Straight Cath'd for 500ml. Patient placed on left side with peanut ball per request. Pitcoin was increased to 10, and then decreased back down to 8 within 30 minutes d/t frequent contractions. VSS. Patient placed into throne position at 0820 and tolerating this.  "

## 2023-07-14 NOTE — H&P
[facility] Labor and Delivery History and Physical    Mariya Mehta MRN# [id number]   Age: 23 year old YOB: 2000     Date of Admission:  2023    Primary care provider: No Ref-Primary, Physician           Chief Complaint:   Mariya Mehta is a 23 year old female who is 42w pregnant and being admitted for failure to enter into labor.  I was called by our local midwife requesting transfer.  She has been using various methods to try to induce labor was unsuccessful.  She was ruptured at 2 PM today, 5 hours prior to admission, but no resulting labor.  She arrived here by private transport.  She reports feeling well.  She is starting to feel more uncomfortable with contractions.  The fluid was clear at rupture.  She is denies fever.  Denies prenatal issues such as diabetes and hypertension.  This is her first pregnancy.  She is hoping to go naturally but is open to the idea of using nitrous if needed.    Prenatal records from the midwifery center reviewed.  There was an 8 out of 8 biophysical last week on .  Vertex position at that time.  GBS negative.  Third trimester hemoglobin 11.  Glucola 115.  Blood type O-.  Unclear if she was given RhoGAM.  Hepatitis C nonreactive.  HIV negative.  Hep B negative.  Rubella nonimmune.  40 pound weight gain.          Pregnancy history:     OBSTETRIC HISTORY:    OB History    Para Term  AB Living   1 0 0 0 0 0   SAB IAB Ectopic Multiple Live Births   0 0 0 0 0      # Outcome Date GA Lbr Morgan/2nd Weight Sex Delivery Anes PTL Lv   1 Current                EDC: Estimated Date of Delivery: 2023    Prenatal Labs: No results found for: ABO, RH, AS, HEPBANG, CHPCRT, GCPCRT, TREPAB, RUBELLAABIGG, HGB, HIV    GBS Status:   Lab Results   Component Value Date    GBS Negative 2023       Active Problem List  Patient Active Problem List   Diagnosis    Normal labor       Medication Prior to Admission  Medications Prior to  "Admission   Medication Sig Dispense Refill Last Dose    Magnesium Citrate 83 MG CHEW Take 166 mg by mouth daily   Unknown    Prenatal MV-Min-Fe Fum-FA-DHA (PRENATAL 1 PO)    7/12/2023 at 0900   .        Maternal Past Medical History:     Past Medical History:   Diagnosis Date    Anxiety                        Family History:   I have reviewed this patient's family history and commented on sigificant items within the HPI            Social History:   I have reviewed this patient's social history and commented on significant items within the HPI         Review of Systems:   The Review of Systems is negative other than noted in the HPI          Physical Exam:   Temp: 97.7  F (36.5  C) Temp src: Oral BP: 135/68 Pulse: 98   Resp: 18 SpO2: 97 % O2 Device: None (Room air)    Patient Vitals for the past 8 hrs:   BP Temp Temp src Pulse Resp SpO2 Height Weight   07/13/23 2110 -- 97.7  F (36.5  C) Oral -- -- -- -- --   07/13/23 1945 135/68 98.3  F (36.8  C) Oral 98 18 97 % 1.651 m (5' 5\") 88 kg (194 lb)       gen - well appearing  CV - RRR  Lungs: CTAB  Abd- gravid, non tender    Extremities:   Warm, well perfused  Edema absent     Cervix:   Membranes: ruptured   Dilation: 6   Effacement:    Station:   Consistency:    Position:   Presentation:Cephalic  Fetal Heart Rate Tracing: reactive and reassuring  Tocometer: external monitor and inadequate     EFW: 7.5-8 lb                 Assessment:   Mariya Mehta is a 39w3d pregnant female admitted with postdates and needing induction.  We discussed options at this point.  She agrees to starting Pitocin.  She would like some pain control with nitrous.  We discussed some of her birth plan.  She is comfortable moving forward.  Her boyfriend's questions were answered to her satisfaction..          Plan:   Start Pitocin  GBS negative  RhoGAM testing needed after delivery  Rubella nonimmune  Pain control with nitrous  Anticipating vaginal delivery        Frederic Mac MD   "

## 2023-07-14 NOTE — PROGRESS NOTES
S: Shift review  B:April is a , day of  birth.  A: Stable post-op, incision is CDI, Lung sounds-clear, bowel sounds active in all 4 quadrants, , pain control achieved with tap block, p.o. pain tylenol and IV toradol. Handles baby with confidence.  Pt's epidural has been slow to wear off.  Was able to sit at edge of bed at 1815 for 10 minutes.  Pt did not feel comfortable trying to ambulate at that time.    Eating regular diet diet and tolerating well.    R: Continue with plan of care. Offer pain meds routinely.

## 2023-07-14 NOTE — PROGRESS NOTES
Updated by RN. Epidural in. Cat 1 strip. 9 cm. Slow progress. Ansvd. Patient urged to start pit, but not yet ready. rh

## 2023-07-14 NOTE — PROGRESS NOTES
Nitrous set up and pt educated on use. Pt states it is helping with the pain. Pts. Christina is present as well as her S.O. and her Mother.

## 2023-07-14 NOTE — PROGRESS NOTES
Talked with patient at bedside. 9cm still. Swollen cervix. Suspect we're not engaging pelvis well. Patient willing to start pit at 5a, and hour ago. Had been resistant to this idea all night. Long discussion about labor dystocia and causes. Discussed the option of section at any time, but especially if on pit and no change with 4-5 hrs. I believe baby to be OA, but possibly OP the way things are slow to progress. EFW 7-8 lbs. Gave expectation that we should make a delivery decision by 11-12 latest, as long as baby is tolerating. Baby is post dates.     rh

## 2023-07-14 NOTE — ANESTHESIA PROCEDURE NOTES
"Epidural catheter Procedure Note    Pre-Procedure   Staff -        Performed By: CRNA       Location: OB       Pre-Anesthestic Checklist: patient identified, IV checked, risks and benefits discussed, informed consent, monitors and equipment checked, pre-op evaluation and at physician/surgeon's request  Timeout:       Correct Patient: Yes        Correct Procedure: Yes        Correct Site: Yes        Correct Position: Yes   Procedure Documentation  Procedure: epidural catheter       Patient Position: sitting       Patient Prep/Sterile Barriers: sterile gloves, mask, patient draped       Skin prep: Betadine       Local skin infiltrated with 3 mL of 1% lidocaine.        Insertion Site: L3-4. (midline approach).       Technique: LORT air        Needle Type: Toyassine needle and Fernandes       Needle Gauge: 17.        Needle Length (Inches): 3.5        Catheter: 19 G.          Catheter threaded easily.         4 cm epidural space.           # of attempts: 1 and  # of redirects:  0    Assessment/Narrative         Paresthesias: No.       Test dose of 3 mL lidocaine 1.5% w/ 1:200,000 epinephrine at.         Test dose negative, 3 minutes after injection, for signs of intravascular, subdural, or intrathecal injection.       Insertion/Infusion Method: LORT air       Aspiration negative for Heme or CSF via Epidural Catheter.       Sensory Level Left: T6.       Sensory Level Right: T6.     Comments:  Pt appeared to tolerate procedure well.  Denied paraesthesias during placement.  There was a negative reaction to the test dose for intrathecal, subdural or intravascular injection.  Pt had an appropriate physiological response to the loading dose.  Will be available if further intervention is required.        FOR [facility] ([facility]) ONLY:   Pain Team Contact information: please page the Pain Team Via [employer]. Search \"Pain\". During daytime hours, please page the attending first. At night please page the resident first.      "

## 2023-07-14 NOTE — ANESTHESIA PREPROCEDURE EVALUATION
Anesthesia Pre-Procedure Evaluation    Patient: Mariya Mehta   MRN: [id number] : 2000        Procedure : * No procedures listed *          Past Medical History:   Diagnosis Date    Anxiety       Past Surgical History:   Procedure Laterality Date    wisdom teeth removal        No Known Allergies   Social History     Tobacco Use    Smoking status: Never    Smokeless tobacco: Never    Tobacco comments:     Vaping intermittently during pregnancy   Substance Use Topics    Alcohol use: Not Currently      Wt Readings from Last 1 Encounters:   23 88 kg (194 lb)        Anesthesia Evaluation   Pt has not had prior anesthetic         ROS/MED HX  ENT/Pulmonary:  - neg pulmonary ROS     Neurologic:  - neg neurologic ROS     Cardiovascular:  - neg cardiovascular ROS     METS/Exercise Tolerance:     Hematologic:  - neg hematologic  ROS     Musculoskeletal:       GI/Hepatic:  - neg GI/hepatic ROS     Renal/Genitourinary:       Endo:  - neg endo ROS     Psychiatric/Substance Use:  - neg psychiatric ROS     Infectious Disease:       Malignancy:       Other:            Physical Exam    Airway        Mallampati: II   TM distance: > 3 FB   Neck ROM: full   Mouth opening: > 3 cm    Respiratory Devices and Support         Dental  no notable dental history         Cardiovascular   cardiovascular exam normal          Pulmonary   pulmonary exam normal                OUTSIDE LABS:  CBC:   Lab Results   Component Value Date    WBC 16.4 (H) 2023    HGB 11.5 (L) 2023    HCT 35.2 2023     2023     BMP: No results found for: NA, POTASSIUM, CHLORIDE, CO2, BUN, CR, GLC  COAGS: No results found for: PTT, INR, FIBR  POC: No results found for: BGM, HCG, HCGS  HEPATIC: No results found for: ALBUMIN, PROTTOTAL, ALT, AST, GGT, ALKPHOS, BILITOTAL, BILIDIRECT, MEHUL  OTHER: No results found for: PH, LACT, A1C, PAIGE, PHOS, MAG, LIPASE, AMYLASE, TSH, T4, T3, CRP, SED    Anesthesia Plan    ASA Status:  2        Anesthesia Type: Epidural.              Consents    Anesthesia Plan(s) and associated risks, benefits, and realistic alternatives discussed. Questions answered and patient/representative(s) expressed understanding.     - Discussed:     - Discussed with:  Patient            Postoperative Care            Comments:    Other Comments: The risks and benefits of anesthesia, and the alternatives where applicable, have been discussed with the patient, and they wish to proceed.    If epidural deemed ineffective will pull EC and perform Spinal for the .       neg OB ROS.       Rizwan Whitehead, APRN CRNA

## 2023-07-14 NOTE — ANESTHESIA PROCEDURE NOTES
"Intrathecal Procedure Note    Pre-Procedure   Staff -        CRNA: Britton Lombardo APRN CRNA       Performed By: CRNA       Location: OR       Pre-Anesthestic Checklist: patient identified, IV checked, risks and benefits discussed, informed consent, monitors and equipment checked and pre-op evaluation  Timeout:       Correct Patient: Yes        Correct Procedure: Yes        Correct Site: Yes        Correct Position: Yes   Procedure Documentation  Procedure: intrathecal       Diagnosis: Failure to progress       Patient Position: sitting       Patient Prep/Sterile Barriers: sterile gloves, mask, patient draped       Skin prep: Betadine       Insertion Site: L3-4. (midline approach).       Needle Gauge: 25.        Needle Length (Inches): 3.5        Spinal Needle Type: Pencan       Introducer used       Introducer: 20 G       # of attempts: 1 and  # of redirects:  1    Assessment/Narrative         Paresthesias: No.       CSF fluid: clear.       Opening pressure was cmH2O while  Sitting.        FOR [facility] ([facility]) ONLY:   Pain Team Contact information: please page the Pain Team Via [employer]. Search \"Pain\". During daytime hours, please page the attending first. At night please page the resident first.      "

## 2023-07-14 NOTE — ANESTHESIA PROCEDURE NOTES
TAP Procedure Note    Pre-Procedure   Staff -        CRNA: Britton Lombardo APRN CRNA       Performed By: CRNA       Location: OR       Pre-Anesthestic Checklist: patient identified, IV checked, site marked, risks and benefits discussed, informed consent, monitors and equipment checked, pre-op evaluation, at physician/surgeon's request and post-op pain management  Timeout:       Correct Patient: Yes        Correct Procedure: Yes        Correct Site: Yes        Correct Position: Yes        Correct Laterality: Yes        Site Marked: Yes  Procedure Documentation  Procedure: TAP       Diagnosis: SP ABDOMINAL INCISION-NEED FOR ANALGESIA       Laterality: bilateral       Patient Position: supine       Patient Prep/Sterile Barriers: sterile gloves, mask       Skin prep: Chloraprep       Needle Type: insulated       Needle Gauge: 22.        Needle Length (millimeters): 100        Ultrasound guided       1. Ultrasound was used to identify targeted nerve, plexus, vascular marker, or fascial plane and place a needle adjacent to it in real-time.       2. Ultrasound was used to visualize the spread of anesthetic in close proximity to the above referenced structure.       3. A permanent image is entered into the patient's record.       4. The visualized anatomic structures appeared normal.       5. There were no apparent abnormal pathologic findings.    Assessment/Narrative         The placement was negative for: blood aspirated, painful injection and site bleeding       Paresthesias: No.       Test dose of mL at.         Test dose negative, 3 minutes after injection, for signs of intravascular, subdural, or intrathecal injection.       Bolus given via needle..        Secured via.        Insertion/Infusion Method: Single Shot       Complications: none       Injection made incrementally with aspirations every 5 mL.     Comments:  Pt tolerated the procedure well as under General Anesthesia.  There was good visualization of the space  "between the internal oblique and the transverses abdominis.  There was also good visualization of fluid dissection in this layer.  No complications were noted.  I will follow up with this pt if needed.      FOR [facility] ([facility]) ONLY:   Pain Team Contact information: please page the Pain Team Via [employer]. Search \"Pain\". During daytime hours, please page the attending first. At night please page the resident first.      "

## 2023-07-14 NOTE — PROGRESS NOTES
SVE per provider remains anterior lip and +1 station. Patient prepped for  after discussion with provider.

## 2023-07-14 NOTE — OR NURSING
Verbal report received from Yolis SANDHU RN and Britton KEANE. Phase 1 recovery assumed by Kassandra BOSCH. Pt post-op spinal for  per .

## 2023-07-14 NOTE — PROGRESS NOTES
Dr. Mac called and informed of pts arrival, status report given. He will place orders and come to see pt.

## 2023-07-14 NOTE — PROGRESS NOTES
S: Transfer to postpartum  B:  birth @ 1146, breastfeeding    A: Mother and baby transferred to postpartum unit at 1330 via bed after completion of immediate recovery period. Patient oriented to room. Mother and baby bonding well and in satisfactory condition upon transfer.  R: Anticipate routine postpartum care.

## 2023-07-14 NOTE — ANESTHESIA CARE TRANSFER NOTE
Patient: April Tierra Mehta    Procedure: Procedure(s):   SECTION       Diagnosis: Failure to progress in labor [O62.2]  Post-term pregnancy beyond 42 weeks, antepartum [O48.1]  Diagnosis Additional Information: No value filed.    Anesthesia Type:   Epidural     Note:    Oropharynx: spontaneously breathing  Level of Consciousness: awake  Oxygen Supplementation: room air    Independent Airway: airway patency satisfactory and stable    Vital Signs Stable: post-procedure vital signs reviewed and stable  Report to RN Given: handoff report given  Patient transferred to: PACU    Handoff Report: Identifed the Patient, Identified the Reponsible Provider, Reviewed the pertinent medical history, Discussed the surgical course, Reviewed Intra-OP anesthesia mangement and issues during anesthesia, Set expectations for post-procedure period and Allowed opportunity for questions and acknowledgement of understanding    Vitals:  Vitals Value Taken Time   /84 23 1250   Temp     Pulse 99 23 1250   Resp     SpO2 96 % 23 1252   Vitals shown include unvalidated device data.    Electronically Signed By: CAMI Anderson CRNA  2023  12:52 PM

## 2023-07-14 NOTE — L&D DELIVERY NOTE
PREOPERATIVE DIAGNOSES:   1. Postdates intrauterine pregnancy at 42 1/7 weeks' gestation.   2. Failure to dilate in active phase of labor     POSTOPERATIVE DIAGNOSIS:   1. Postdates intrauterine pregnancy at 42 1/7 weeks' gestation.   2. Failure to dilate in active phase of labor    PROCEDURE: Primary low transverse  section.     SURGEON: Garry   PRIMARY: Itzel Verma Lay midwife  ASSISTANT: Estefania Mac was asked to assist in the  Section because of the special skill set he/she possess in Myotomy repair, fascial closure, and special skin closure techniques.  Dr. Mac was a vital assistant in all of the above listed procedures. His/Her skills allowed us to shorten the length of the procedure considerably, which has been shown to decrease risk of infection, decrease post operative morbidity, and improve outcomes.     ANESTHESIA: Epidural then Spinal with Britton Lombardo CRNA.     INDICATIONS: PrimiparousMultiparous patient who is at 42 1/7 weeks' gestation. Please see my consult or EPIC note for details. This patient had a failed labor augmentation at  42 1/7 weeks' gestation. See her PNs in [facility] for details of her labor.    PROCEDURE: April was taken to the operating room where spinal anesthesia was placed after her epidural bolus failed to give adequate anesthesia. She was then laid in the supine position and placed in stirrups due to the station of the baby, where she was prepped and draped in the routine sterile fashion after a Beal catheter was placed. Under adequate anesthesia, a Pfannenstiel incision was made with a #15 blade. The incision was extended down through the subcutaneous tissue with blunt and sharp dissection, cauterizing any bleeding as we went. The midline of the fascia was scored with a #15 blade and carried transversely in both directions with a curved Mishra scissors. The fascia was then dissected off the anterior surface of the rectus muscle inferiorly and  superiorly with both sharp and blunt dissection. Again, any bleeding was cauterized and taken care of at the time. The rectus muscles were then  in the midline and entered with sharp dissection. This was then extended with blunt dissection in both directions until adequate space was appreciated. A bladder flap was created by tenting the uterovesicular peritoneum with DeBakeys and incising with a Metzenbaum scissors. The Troy O self-retaining retractor was then inserted. With good exposure, we then incised the uterus with a #15 blade, carefully getting down to the final layer. This was then broken through with a Alissa and then the uterine incision was extended with finger dissection in both directions transversely. The presenting part was identified and noted to be quite low in the pelvis.  The labor nurse with sterile gloves, elevated the fetal head vaginally while the fetal head was grasped from above.  Once the suction was broken the head was then delivered through the abdominal incision atraumatically. The shoulders and remainder of the body were then delivered and spontaneous crying and respirations were noted with this viable . Suctioning through the mouth and nares was done at this time. The cord was clamped x2 and cut and the baby brought over to the warming table and nursing staff by the assistant. Cord bloods were then obtained.     The placenta was then removed manually intact. The cut surface of the uterus was then isolated with Tirado clamps. The uterine incision was closed using 0 Vicryl in a running locked fashion, followed by a second imbricating layer of 0 Monocryl. Any bleeding was controlled with figure-of-eight sutures. Hemostasis was excellent.     The bladder flap was not repaired.     Irrigation was then done to remove any blood clots and debris. On reinspection, the uterine scar was dry and clear of any bleeding.     The parietal peritoneum was then closed using 0 Vicryl in a  running fashion. The rectus muscles were brought together to the midline with 0 Vicryl interrupted sutures.     The corners of the fascia on either end were isolated with Kocher clamps. The fascia was then closed with 0 Vicryl in a running fashion. The subq layer was closed by using   3 interrupted sutures of 2-0 plain catgut suture. The skin was then closed with Insorb resorbable staples and sealed with Exofin glue.    VARIATIONS: None .     ESTIMATED BLOOD LOSS: 355 mL.     TOTAL FLUIDS IN: 450 ml of LR.     TOTAL URINE OUTPUT: About 300 mL of clear urine.     Both mom and this male  with apgars of 7 and 9 at 1 and 5 min respectively, which weighed 8 pounds 1 ounces or 3655 grams were stable upon me leaving the delivery room.     OPERATING ROOM TIMES:   Into the operating room at 1117, time of incision 1139, time of delivery 1146, and time of closure 1216.     Electronically signed by:  Isaiah Castañeda M.D.  2023

## 2023-07-14 NOTE — PLAN OF CARE
Goal Outcome Evaluation:      Problem: Plan of Care - These are the overarching goals to be used throughout the patient stay.    Goal: Absence of Hospital-Acquired Illness or Injury  Intervention: Prevent Skin Injury  Recent Flowsheet Documentation  Taken 7/13/2023 1940 by Mini Beauchamp RN  Body Position: position changed independently  Goal: Optimal Comfort and Wellbeing  Intervention: Provide Person-Centered Care  Recent Flowsheet Documentation  Taken 7/13/2023 1940 by Mini Beauchamp RN  Trust Relationship/Rapport:   care explained   choices provided   emotional support provided   empathic listening provided   questions answered   questions encouraged   reassurance provided   thoughts/feelings acknowledged  Goal: Readiness for Transition of Care  Recent Flowsheet Documentation  Taken 7/13/2023 2020 by Mini Beauchamp RN  Anticipated Changes Related to Illness: none  Concerns to be Addressed: no discharge needs identified  Intervention: Mutually Develop Transition Plan  Recent Flowsheet Documentation  Taken 7/13/2023 2020 by Mini Beauchamp RN  Anticipated Changes Related to Illness: none  Concerns to be Addressed: no discharge needs identified  Taken 7/13/2023 2016 by Mini Beauchamp RN  Equipment Currently Used at Home: none     Problem: Labor  Goal: Stable Fetal Wellbeing  Intervention: Promote and Monitor Fetal Wellbeing  Recent Flowsheet Documentation  Taken 7/13/2023 1940 by Mini Beauchamp RN  Body Position: position changed independently

## 2023-07-14 NOTE — PROGRESS NOTES
S:  Section Delivery  B: Primary  Section  @ 1146  A: Baby delivered, cord clamping delayed for 60-90 seconds, then brought to pre- warmed infant warmer. Infant stimulated and dried. Infant then brought to mother and placed skin to skin for bonding. Apgars 7/9. Educated mother on expected feeding readiness cues and encouraged her to observe for feeding cues. Mother informed that breast feeding assistance would be provided.   R: Mother and baby bonding well. Anticipate first feed within the hour.

## 2023-07-14 NOTE — PROGRESS NOTES
Pitocin was ordered and this RN went to begin the Pitocin. The patient requested that I check her before starting. SVE 7/80/-2. Pt is refusing Pitocin at this time. Dr. Mac informed.

## 2023-07-14 NOTE — OR NURSING
Transfer from   330 PACU to Room 361  Transferred via bed.      S: 24 y/o female  S/P        Anesthesia Type:  spinal       Surgeon:  Dr. Castañeda       Allergies:  See Medication Reconciliation Record       DNR: NO      B:  Pertinent Medical History:   Past Medical History:   Diagnosis Date     Anxiety                 Surgical History:    Past Surgical History:   Procedure Laterality Date     wisdom teeth removal         A:  EBL: 340 ml        IVF:  450 ml        UOP:  300 ml        NPO:  ___Yes _x__No; ice chips         Vomiting:  ___Yes _x__No; denied c/o nausea         Drainage: sm.amt. rubra noted        Skin Integrity: normal x/ incision          RFO: _x__Yes___No; patel cath        SSI Patient?  ___Yes___No (if yes, see checklist for actions)        Brace/sling/equipment:  _x__Yes___No; pneumoboots       See PACU record for ongoing assessment, vital signs and pain assessment.    R: Post-Op vitals and assessments as ordered/indicated per patient's condition.       Follow Post-Op orders and notify Physician prn.       Continue to involve patient/family in plan of care and discharge planning.       Reinforce Pre-Operative education.       Implement skin safety interventions as appropriate.  Verbal report given to Rosie BOSCH OB    Name: Kassandra Blanco PACU HIRO

## 2023-07-14 NOTE — PROGRESS NOTES
Patient currently still in throne position per request. Tolerating contractions, regular pattern. Cat I strip.

## 2023-07-15 LAB
ABO/RH(D): NORMAL
FETAL BLEED SCREEN: NORMAL
HGB BLD-MCNC: 9.7 G/DL (ref 11.7–15.7)
SPECIMEN EXPIRATION DATE: NORMAL

## 2023-07-15 PROCEDURE — 86901 BLOOD TYPING SEROLOGIC RH(D): CPT | Performed by: FAMILY MEDICINE

## 2023-07-15 PROCEDURE — 3E0234Z INTRODUCTION OF SERUM, TOXOID AND VACCINE INTO MUSCLE, PERCUTANEOUS APPROACH: ICD-10-PCS | Performed by: FAMILY MEDICINE

## 2023-07-15 PROCEDURE — 90471 IMMUNIZATION ADMIN: CPT | Performed by: FAMILY MEDICINE

## 2023-07-15 PROCEDURE — 36415 COLL VENOUS BLD VENIPUNCTURE: CPT | Performed by: FAMILY MEDICINE

## 2023-07-15 PROCEDURE — 99231 SBSQ HOSP IP/OBS SF/LOW 25: CPT | Performed by: FAMILY MEDICINE

## 2023-07-15 PROCEDURE — 250N000013 HC RX MED GY IP 250 OP 250 PS 637: Performed by: FAMILY MEDICINE

## 2023-07-15 PROCEDURE — 90707 MMR VACCINE SC: CPT | Performed by: FAMILY MEDICINE

## 2023-07-15 PROCEDURE — 250N000011 HC RX IP 250 OP 636: Mod: JZ | Performed by: FAMILY MEDICINE

## 2023-07-15 PROCEDURE — 120N000001 HC R&B MED SURG/OB

## 2023-07-15 PROCEDURE — 85018 HEMOGLOBIN: CPT | Performed by: FAMILY MEDICINE

## 2023-07-15 RX ADMIN — IBUPROFEN 800 MG: 800 TABLET, FILM COATED ORAL at 14:51

## 2023-07-15 RX ADMIN — KETOROLAC TROMETHAMINE 30 MG: 30 INJECTION, SOLUTION INTRAMUSCULAR; INTRAVENOUS at 02:00

## 2023-07-15 RX ADMIN — ACETAMINOPHEN 975 MG: 325 TABLET, FILM COATED ORAL at 23:22

## 2023-07-15 RX ADMIN — ACETAMINOPHEN 975 MG: 325 TABLET, FILM COATED ORAL at 04:55

## 2023-07-15 RX ADMIN — SENNOSIDES AND DOCUSATE SODIUM 2 TABLET: 50; 8.6 TABLET ORAL at 20:45

## 2023-07-15 RX ADMIN — ACETAMINOPHEN 975 MG: 325 TABLET, FILM COATED ORAL at 11:09

## 2023-07-15 RX ADMIN — SENNOSIDES AND DOCUSATE SODIUM 1 TABLET: 8.6; 5 TABLET ORAL at 08:57

## 2023-07-15 RX ADMIN — ACETAMINOPHEN 975 MG: 325 TABLET, FILM COATED ORAL at 17:52

## 2023-07-15 RX ADMIN — HUMAN RHO(D) IMMUNE GLOBULIN 300 MCG: 1500 SOLUTION INTRAMUSCULAR; INTRAVENOUS at 10:11

## 2023-07-15 RX ADMIN — IBUPROFEN 800 MG: 800 TABLET, FILM COATED ORAL at 08:57

## 2023-07-15 RX ADMIN — MEASLES, MUMPS, AND RUBELLA VIRUS VACCINE LIVE 0.5 ML: 1000; 12500; 1000 INJECTION, POWDER, LYOPHILIZED, FOR SUSPENSION SUBCUTANEOUS at 11:10

## 2023-07-15 RX ADMIN — IBUPROFEN 800 MG: 800 TABLET, FILM COATED ORAL at 20:46

## 2023-07-15 ASSESSMENT — ACTIVITIES OF DAILY LIVING (ADL)
ADLS_ACUITY_SCORE: 20

## 2023-07-15 NOTE — PLAN OF CARE
Goal Outcome Evaluation:    Incision WDL. Ice applied to incision intermittently. Not using abdominal binder previously given. Pt reports pain is controlled with scheduled pain medications. Rhogam and MMR given today. Patient ambulating frequently. Voids today of 150ml and 400ml with adequate oral intake. Denies HA, dizziness, nausea. VSS. Patient independent with personal cares. Encouragement needed with each feeding session; RN provided encouragement for use of nipple shield and nipple everter. Pt reports she has used this but it does not change feeding sessions. RN encouraged supplementing infant with previously expressed colostrum by syringe - father went to  from home today.

## 2023-07-15 NOTE — PROGRESS NOTES
[facility] Obstetrics Post-Op / Progress Note         Assessment and Plan:    Assessment:   Post-operative day #1  Low transverse primary  section  L&D complications: Intrauterine pregnancy at 42 1/4 weeks gestation  Post-dates  Primary  section      Doing well.  Clean wound without signs of infection.  Normal healing wound.  No immediate surgical complications identified.  No excessive bleeding  Pain well-controlled.      Plan:   Ambulation encouraged  Breast feeding strategies discussed  Reportable signs and symptoms dicussed with the patient  RhoGam given  Start iron supplementation  Anticipate discharge tomorrow           Interval History:   Doing well.  Pain is well-controlled.  No fevers.  No history of wound drainage, warmth or significant erythema.  Good appetite.  Denies chest pain, shortness of breath, nausea or vomiting.  Ambulatory.  Breastfeeding well.          Significant Problems:    None          Review of Systems:    The patient denies any chest pain, shortness of breath, excessive pain, fever, chills, purulent drainage from the wound, nausea or vomiting.          Medications:   All medications related to the patient's surgery have been reviewed          Physical Exam:   All vitals stable  Patient Vitals for the past 8 hrs:   BP Temp Temp src Pulse Resp SpO2   07/15/23 0456 119/47 -- -- 100 17 97 %   07/15/23 0206 105/43 97.9  F (36.6  C) Oral 80 15 96 %     LUNGS:  Clear to auscultation bilaterally, no wheezing, rhonci or rales.  CV:  RRR nl S1/S2 no murmur.   Wound clean and dry with minimal or no drainage.  Surrounding skin with minimal erythema.  Extremities: No edema.           Data:   All laboratory data related to this surgery reviewed  Hemoglobin   Date Value Ref Range Status   07/15/2023 9.7 (L) 11.7 - 15.7 g/dL Final   2023 11.5 (L) 11.7 - 15.7 g/dL Final     No imaging studies have been ordered    Electronically signed by:  Isaiah Castañeda  M.D.  7/15/2023          DISPLAY PLAN FREE TEXT

## 2023-07-15 NOTE — PROGRESS NOTES
Patient was up with stand by assist to bathroom at 2045, patel removed, tony cares. Patient then ambulated around her room and tolerated well. IV remains saline locked. VSS. Fundus firm at U with light bleeding. Assisted with positioning baby to breast, nipple everter and nipple shield for baby to latch deep. Patient's SO at bedside and very supportive. Reviewed plan for the night. Reassurance and listening provided.

## 2023-07-15 NOTE — PLAN OF CARE
Goal Outcome Evaluation:    17 hours post primary  for failure to dilate. VSS. BPs have been low 100s/40s-50s; had elevated BPs yesterday. No clonus, no HA, no change of vision. Dependent bilateral edema +1. Incision well approximated, no drainage, using ice packs for comfort. Abdominal binder given, patient declines use at this time. Fundus firm at U, light bleeding. Pain being managed with scheduled tylenol and toradol. IV remains saline locked. Patient educated on Rhogam workup and rhophylac. Tolerating regular diet. Encouraging increased water intake. Patient has voided twice thus far since patel removal at  yesterday evening; amounts of 100ml and 150ml with dark mary urine. Patient emptying bladder with voids. Assisted with breastfeeding. Using nipple everter and at times nipple shield. Able to latch baby with football hold on right side and cross cradle for left side. Gaining confidence in cares for self and baby.

## 2023-07-15 NOTE — ANESTHESIA POSTPROCEDURE EVALUATION
Patient: Mariya Mehta    Procedure: Procedure(s):   SECTION       Anesthesia Type:  Epidural    Note:  Disposition: Inpatient   Postop Pain Control: Uneventful            Sign Out: Well controlled pain   PONV: No   Neuro/Psych: Uneventful            Sign Out: Acceptable/Baseline neuro status   Airway/Respiratory: Uneventful            Sign Out: Acceptable/Baseline resp. status   CV/Hemodynamics: Uneventful            Sign Out: Acceptable CV status   Other NRE: NONE   DID A NON-ROUTINE EVENT OCCUR? No    Event details/Postop Comments:   Pt doing well, no anesthesia concerns noted/stated, full motor/sensory function lower extremities, pt satisfied with anesthesia care, pt aware to have Rn call anesthesia if any future questions/concerns come up.      Epidural-to- Updated ASA: 2      Last vitals:  Vitals Value Taken Time   /89 23 1315   Temp 98  F (36.7  C) 23 1315   Pulse 102 23 1315   Resp 16 23 1315   SpO2 95 % 23 1320       Electronically Signed By: CAMI Ragland CRNA  2023  7:10 PM

## 2023-07-16 VITALS
DIASTOLIC BLOOD PRESSURE: 59 MMHG | HEIGHT: 65 IN | SYSTOLIC BLOOD PRESSURE: 129 MMHG | OXYGEN SATURATION: 97 % | TEMPERATURE: 97.6 F | WEIGHT: 194 LBS | BODY MASS INDEX: 32.32 KG/M2 | HEART RATE: 85 BPM | RESPIRATION RATE: 15 BRPM

## 2023-07-16 PROCEDURE — 99238 HOSP IP/OBS DSCHRG MGMT 30/<: CPT | Performed by: FAMILY MEDICINE

## 2023-07-16 PROCEDURE — 250N000013 HC RX MED GY IP 250 OP 250 PS 637: Performed by: FAMILY MEDICINE

## 2023-07-16 RX ORDER — FERROUS GLUCONATE 324(38)MG
324 TABLET ORAL
Qty: 14 TABLET | Refills: 0 | Status: SHIPPED | OUTPATIENT
Start: 2023-07-16 | End: 2023-07-30

## 2023-07-16 RX ORDER — IBUPROFEN 800 MG/1
800 TABLET, FILM COATED ORAL EVERY 6 HOURS
Qty: 90 TABLET | Refills: 1 | Status: SHIPPED | OUTPATIENT
Start: 2023-07-16 | End: 2024-02-26

## 2023-07-16 RX ORDER — ACETAMINOPHEN 500 MG
1000 TABLET ORAL EVERY 6 HOURS
Start: 2023-07-16 | End: 2024-02-26

## 2023-07-16 RX ADMIN — IBUPROFEN 800 MG: 800 TABLET, FILM COATED ORAL at 08:56

## 2023-07-16 RX ADMIN — ACETAMINOPHEN 975 MG: 325 TABLET, FILM COATED ORAL at 06:18

## 2023-07-16 RX ADMIN — IBUPROFEN 800 MG: 800 TABLET, FILM COATED ORAL at 01:29

## 2023-07-16 RX ADMIN — SENNOSIDES AND DOCUSATE SODIUM 2 TABLET: 50; 8.6 TABLET ORAL at 08:56

## 2023-07-16 ASSESSMENT — ACTIVITIES OF DAILY LIVING (ADL)
ADLS_ACUITY_SCORE: 20

## 2023-07-16 NOTE — PLAN OF CARE
Goal Outcome Evaluation:    Day 2 post primary  for failure to dilate. VSS. Incision well approximated with liquid bandage, scant drainage from left side of incision. Fundus firm, scant to light bleeding. Using abdominal binder this morning while up and about in room. Tolerating regular diet. + flatus. Took 2 tablets Senna in evening and drank prune juice, no BM yet. Pain being well managed with scheduled Tylenol and Ibuprofen. Assisted with latching baby at breast. Has been pumping during the night and finger feeding baby as well. Offered to help with supplementing EBM at the breast. Lanolin given for tender nipples. Independent in cares for self and baby. Hopeful to discharge home today.

## 2023-07-16 NOTE — DISCHARGE INSTRUCTIONS
[facility] Discharge Instructions     Discharge disposition:  Discharged to home       Diet:  Regular       Activity Lifting restricted to 20 pounds for the first 2 wks, then increase by 10 pounds every week thereafter.  No tub soaks for >15 minutes the first 2 wks then as desired.  No driving for 2 week(s).  No sex for 6 week(s).       Follow-up: Follow up with primary care provider in 2-3 days then at 6 wks for post partum exam and to discuss birth control options       Additional instructions: Wound care / dressings: may shower and keep wound clean and dry      Postop  Birth Instructions    Activity     Do not lift more than 10 pounds for 6 weeks after surgery.  Ask family and friends for help when you need it.  No driving until you have stopped taking your pain medications (usually two weeks after surgery).  No heavy exercise or activity for 6 weeks.  Don't do anything that will put a strain on your surgery site.  Don't strain when using the toilet.  Your care team may prescribe a stool softener if you have problems with your bowel movements.     To care for your incision:     Keep the incision clean and dry.  Do not soak your incision in water. No swimming or hot tubs until it has fully healed. You may soak in the bathtub if the water level is below your incision.  Do not use peroxide, gel, cream, lotion, or ointment on your incision.  Adjust your clothes to avoid pressure on your surgery site (check the elastic in your underwear for example).     You may see a small amount of clear or pink drainage and this is normal.  Check with your health care provider:     If the drainage increases or has an odor.  If the incision reddens, you have swelling, or develop a rash.  If you have increased pain and the medicine we prescribed doesn't help.  If you have a fever above 100.4 F (38 C) with or without chills when placing thermometer under your tongue.   The area around your  incision (surgery wound), will feel numb.  This is normal. The numbness should go away in less than a year.     Keep your hands clean:  Always wash your hands before touching your incision (surgery wound). This helps reduce your risk of infection. If your hands aren't dirty, you may use an alcohol hand-rub to clean your hands. Keep your nails clean and short.    Call your healthcare provider if you have any of these symptoms:     You soak a sanitary pad with blood within 1 hour, or you see blood clots larger than a golf ball.  Bleeding that lasts more than 6 weeks.  Vaginal discharge that smells bad.  Severe pain, cramping or tenderness in your lower belly area.  A need to urinate more frequently (use the toilet more often), more urgently (use the toilet very quickly), or it burns when you urinate.  Nausea and vomiting.  Redness, swelling or pain around a vein in your leg.  Problems breastfeeding or a red or painful area on your breast.  Chest pain and cough or are gasping for air.  Problems with coping with sadness, anxiety or depression. If you have concerns about hurting yourself or the baby, call your provider immediately.    You have questions or concerns after you return home.

## 2023-07-16 NOTE — PLAN OF CARE
Goal Outcome Evaluation:    S: Discharge  to home    B: Patient had a  delivery with no complications. Baby boy Baby's name Angus Gupta, breast: and expressed breast milk. Support person's name Kurt Gupta.     A: Incision had scant drainage; Dr. Castañeda OK with reapplying skin glue prior to discharge. Fundus firm, midline, 1 below U. Light flow, no clots. Patient using ice intermittently on incision. Independent with all personal cares. +1 edema in lower extremities.     R: Discharge instructions reviewed and questions answered.  Belongings gathered and returned to the patient. Agreed to follow up with midwife. Encouraged to call with any questions or concerns.     Nursing Discharge Checklist:    Pneumovax screened and given, if appropriate: N/A  Influenza vaccine screened and given, if appropriate: N/A  Staples removed (): N/A  Breast milk returned: N/A  Hydrogel pads sent home:N/A  Birth Certificate Done: YES  Public Health Referral Made: N/A

## 2023-07-16 NOTE — DISCHARGE SUMMARY
[facility] Discharge Summary    April Tierra Mehta MRN# [id number]   Age: 23 year old YOB: 2000     Date of Admission:  2023  Date of Discharge::  2023  Admitting Physician:  Frederic Mac MD  Discharge Physician:  Isaiah Castañeda MD, MD     Home clinic: [facility]          Admission Diagnoses:   Normal labor [O80, Z37.9] Post dates pregnancy  Labor augmentation and pain management          Discharge Diagnosis:     Failure to progress in labor [O62.2]  Post-term pregnancy beyond 42 weeks, antepartum [O48.1]  Anemia secondary to acute blood loss          Procedures:     Procedure(s): Primary low transverse  section       Epidural for pain management           Medications Prior to Admission:     Medications Prior to Admission   Medication Sig Dispense Refill Last Dose    Prenatal MV-Min-Fe Fum-FA-DHA (PRENATAL 1 PO)    2023 at 0900    [DISCONTINUED] Magnesium Citrate 83 MG CHEW Take 166 mg by mouth daily   Unknown             Discharge Medications:     Current Discharge Medication List        START taking these medications    Details   acetaminophen (TYLENOL) 500 MG tablet Take 2 tablets (1,000 mg) by mouth every 6 hours    Associated Diagnoses: S/P  section      ferrous gluconate (FERGON) 324 (38 Fe) MG tablet Take 1 tablet (324 mg) by mouth daily (with breakfast) for 14 days  Qty: 14 tablet, Refills: 0    Associated Diagnoses: Anemia due to blood loss, acute      ibuprofen (ADVIL/MOTRIN) 800 MG tablet Take 1 tablet (800 mg) by mouth every 6 hours  Qty: 90 tablet, Refills: 1    Associated Diagnoses: S/P  section           CONTINUE these medications which have NOT CHANGED    Details   Prenatal MV-Min-Fe Fum-FA-DHA (PRENATAL 1 PO)            STOP taking these medications       Magnesium Citrate 83 MG CHEW Comments:   Reason for Stopping:                     Consultations:   No consultations were requested during this admission           Brief History of Labor or Admission:   Patient was planning on [facility] at [facility] in [place], but despite 42 wks gestation and trying various methods to stimulate labor including AROM, no regular contractions or progress was made.  She was sent to L&D for pain management and evaluation where she did start having some progress, but refused pitocin until 5 am after being stuck at 8 cm for about 5 hours.  She had been on max pit of 10 mlUnits/min continuous drip with very good contractions but did not dilate completely and had a thick anterior lip for almost 3 hours.  She and her partner decided to proceed with a primary CS for delivery of an 8 # male .             Hospital Course:   The patient's hospital course was unremarkable.  She recovered as anticipated and experienced no post-operative complications.  On discharge, her pain was well controlled. Vaginal bleeding is similar to peak menstrual flow.  Voiding without difficulty.  Ambulating well and tolerating a normal diet.  No fever or significant wound drainage.  Breastfeeding well.  Infant is stable.  Bowel movement x 1  She was discharged on post-partum day #2.    Post-partum hemoglobin:   Hemoglobin   Date Value Ref Range Status   07/15/2023 9.7 (L) 11.7 - 15.7 g/dL Final             Discharge Instructions and Follow-Up:     Discharge diet: Regular   Discharge activity: Lifting restricted to 20 pounds for the first 2 wks, then increase by 10 pounds every week thereafter.  No tub soaks for >15 minutes the first 2 wks then as desired.  No driving for 2 week(s).  No sex for 6 week(s).    Discharge follow-up: Follow up with primary care provider in 2-3 days for wound inspection then 6 wks for pp exam and birth control options.   Wound care: Drink plenty of fluids  Ice to area for comfort  Keep wound clean and dry           Discharge Disposition:     Discharged to home      Attestation:  I have reviewed today's vital signs, notes,  medications, labs and imaging.  Amount of time performed on this discharge summary: 20 minutes.    Electronically signed by:  Isaiah Castañeda M.D.  7/16/2023

## 2023-07-23 NOTE — PROGRESS NOTES
April Tierra Mehta  Gender: female  : 2000  [address]   [facility] [id number]  [phone] (home)   Medical Record: [id number]  Primary Care Provider: No Ref-Primary, Physician       [facility]   ?   Discharge Phone Call: Key Words/Key Times     How are you and the baby? Doing well, baby is currently napping    How are feedings going? Going well, has had a lactation consult with Jessica from the birth center.     Voiding & Stooling? Yes    Any questions or concerns? Has concern about possible tongue tie, will be seeing chiropractor for this    Follow-up appointment? Following up with [facility]      We want to provide excellent care here at [facility]. Do you have any feedback for us that would help us improve? No concerns    Call back COMMENTS:   Happy with the care that they received here.   Sasha Simpson RN on 2023 at 12:07 PM

## 2023-10-18 DIAGNOSIS — J35.8 TONSIL STONE: ICD-10-CM

## 2023-10-18 DIAGNOSIS — J03.90 TONSILLITIS: Primary | ICD-10-CM

## 2023-10-22 ENCOUNTER — HEALTH MAINTENANCE LETTER (OUTPATIENT)
Age: 23
End: 2023-10-22

## 2023-12-11 ENCOUNTER — MEDICAL CORRESPONDENCE (OUTPATIENT)
Dept: HEALTH INFORMATION MANAGEMENT | Facility: CLINIC | Age: 23
End: 2023-12-11
Payer: COMMERCIAL

## 2024-01-30 NOTE — PROGRESS NOTES
ENT Consultation    Mariya Mehta who is a 24 year old female seen in consultation at the request of Dr. Isaiah Castañeda.      History of Present Illness - Mariya Mehta is a 24 year old female patient presents with history of tonsil issues.  Since age of 17 she has had significant problems with tonsil stones severe halitosis.  Tonsils are quite large of inflammatory causing some discomfort in the throat.  She also had severe Cornelius-Barr virus infection over a year ago that lasted for a while made things worse.  Recently for few months has had also significant tonsillitis even though was strep negative.  Denies any respiratory issues any breathing issues any snoring.          BP Readings from Last 1 Encounters:   07/16/23 129/59       BP noted to be well controlled today in office.     April IS NOT a smoker/uses chewing tobacco.  Tonsils appear to be 2+ in size with multiple crypts some tonsil stones noted and erythema without exudates.      Past Medical History -   Past Medical History:   Diagnosis Date    Anxiety        Current Medications -   Current Outpatient Medications:     acetaminophen (TYLENOL) 500 MG tablet, Take 2 tablets (1,000 mg) by mouth every 6 hours, Disp: , Rfl:     ibuprofen (ADVIL/MOTRIN) 800 MG tablet, Take 1 tablet (800 mg) by mouth every 6 hours, Disp: 90 tablet, Rfl: 1    Prenatal MV-Min-Fe Fum-FA-DHA (PRENATAL 1 PO), , Disp: , Rfl:     Allergies - No Known Allergies    Social History -   Social History     Socioeconomic History    Marital status: Single   Tobacco Use    Smoking status: Never    Smokeless tobacco: Never    Tobacco comments:     Vaping intermittently during pregnancy   Substance and Sexual Activity    Alcohol use: Not Currently    Drug use: Never    Sexual activity: Yes     Partners: Male       Family History -   Family History   Problem Relation Age of Onset    No Known Problems Mother     Stomach Problem Father     No Known Problems Sister     No Known  Problems Brother     No Known Problems Maternal Aunt     No Known Problems Maternal Uncle     Peripheral Neuropathy Maternal Grandfather        Review of Systems - As per HPI and PMHx, otherwise review of system review of the head and neck negative. Otherwise 10+ review of system is negative    Physical Exam  There were no vitals taken for this visit.  BMI: There is no height or weight on file to calculate BMI.    General - The patient is well nourished and well developed, and appears to have good nutritional status.  Alert and oriented to person and place, answers questions and cooperates with examination appropriately.    SKIN - No suspicious lesions or rashes.  Respiration - No respiratory distress.  Head and Face - Normocephalic and atraumatic, with no gross asymmetry noted of the contour of the facial features.  The facial nerve is intact, with strong symmetric movements.    Voice and Breathing - The patient was breathing comfortably without the use of accessory muscles. The patients voice was clear and strong, and had appropriate pitch and quality.    Ears - Bilateral pinna and EACs with normal appearing overlying skin. Tympanic membrane intact with good mobility on pneumatic otoscopy bilaterally. Bony landmarks of the ossicular chain are normal. The tympanic membranes are normal in appearance. No retraction, perforation, or masses.  No fluid or purulence was seen in the external canal or the middle ear.     Eyes - Extraocular movements intact.  Sclera were not icteric or injected, conjunctiva were pink and moist.    Mouth - Examination of the oral cavity showed pink, healthy oral mucosa. No lesions or ulcerations noted.  The tongue was mobile and midline, and the dentition were in good condition.      Throat - The walls of the oropharynx were smooth, pink, moist, symmetric, and had no lesions or ulcerations.  The tonsillar pillars and soft palate were symmetric.  The uvula was midline on elevation.  Tonsils  appear to be 2+ in size with multiple crypts tonsil stones erythema without exudates.    Neck - Normal midline excursion of the laryngotracheal complex during swallowing.  Full range of motion on passive movement.  Palpation of the occipital, submental, submandibular, internal jugular chain, and supraclavicular nodes did not demonstrate any abnormal lymph nodes or masses.  The carotid pulse was palpable bilaterally.  Palpation of the thyroid was soft and smooth, with no nodules or goiter appreciated.  The trachea was mobile and midline.    Nose - External contour is symmetric, no gross deflection or scars.  Nasal mucosa is pink and moist with no abnormal mucus.  The septum was midline and non-obstructive, turbinates of normal size and position.  No polyps, masses, or purulence noted on examination.    Neuro - Nonfocal neuro exam is normal, CN 2 through 12 intact, normal gait and muscle tone.      Performed in clinic today:  No procedures preformed in clinic today      A/P - April Tierra Mehta is a 24 year old female without chronic tonsillitis tonsil stones.  We discussed different treatment options patient is very interested in tonsillectomy.Based on the physical exam and history, my recommendation is for tonsillectomy (with or without adenoidectomy).  The remainder of the visit was spent discussing the risks and benefits of tonsillectomy.      These included:The risks of general anesthesia, bleeding, infection, possible need for emergency surgery to control bleeding, possible alteration of speech and swallowing, and even the possibility of continued throat problems following surgery.They understood and wished to call in and schedule.       Luis Armando Dawson MD

## 2024-02-12 ENCOUNTER — OFFICE VISIT (OUTPATIENT)
Dept: OTOLARYNGOLOGY | Facility: CLINIC | Age: 24
End: 2024-02-12
Attending: OTOLARYNGOLOGY
Payer: COMMERCIAL

## 2024-02-12 ENCOUNTER — PREP FOR PROCEDURE (OUTPATIENT)
Dept: OTOLARYNGOLOGY | Facility: CLINIC | Age: 24
End: 2024-02-12

## 2024-02-12 ENCOUNTER — TELEPHONE (OUTPATIENT)
Dept: OTOLARYNGOLOGY | Facility: CLINIC | Age: 24
End: 2024-02-12

## 2024-02-12 VITALS
SYSTOLIC BLOOD PRESSURE: 110 MMHG | WEIGHT: 152.3 LBS | HEIGHT: 65 IN | BODY MASS INDEX: 25.37 KG/M2 | TEMPERATURE: 97.9 F | DIASTOLIC BLOOD PRESSURE: 62 MMHG

## 2024-02-12 DIAGNOSIS — J35.01 CHRONIC TONSILLITIS: Primary | ICD-10-CM

## 2024-02-12 DIAGNOSIS — J35.01 CHRONIC TONSILLITIS: ICD-10-CM

## 2024-02-12 DIAGNOSIS — J35.8 TONSIL STONE: ICD-10-CM

## 2024-02-12 PROCEDURE — 99204 OFFICE O/P NEW MOD 45 MIN: CPT | Performed by: OTOLARYNGOLOGY

## 2024-02-12 ASSESSMENT — PAIN SCALES - GENERAL: PAINLEVEL: NO PAIN (0)

## 2024-02-12 NOTE — LETTER
2/12/2024         RE: Mariya Mehta  [address]  [facility] [address]        Dear Colleague,    Thank you for referring your patient, Mariya Mehta, to the [facility]. Please see a copy of my visit note below.    ENT Consultation    Mariya Mehta who is a 24 year old female seen in consultation at the request of Dr. Isaiah Castañeda.      History of Present Illness - Mariya Mehta is a 24 year old female patient presents with history of tonsil issues.  Since age of 17 she has had significant problems with tonsil stones severe halitosis.  Tonsils are quite large of inflammatory causing some discomfort in the throat.  She also had severe Cornelius-Barr virus infection over a year ago that lasted for a while made things worse.  Recently for few months has had also significant tonsillitis even though was strep negative.  Denies any respiratory issues any breathing issues any snoring.          BP Readings from Last 1 Encounters:   07/16/23 129/59       BP noted to be well controlled today in office.     April IS NOT a smoker/uses chewing tobacco.  Tonsils appear to be 2+ in size with multiple crypts some tonsil stones noted and erythema without exudates.      Past Medical History -   Past Medical History:   Diagnosis Date     Anxiety        Current Medications -   Current Outpatient Medications:      acetaminophen (TYLENOL) 500 MG tablet, Take 2 tablets (1,000 mg) by mouth every 6 hours, Disp: , Rfl:      ibuprofen (ADVIL/MOTRIN) 800 MG tablet, Take 1 tablet (800 mg) by mouth every 6 hours, Disp: 90 tablet, Rfl: 1     Prenatal MV-Min-Fe Fum-FA-DHA (PRENATAL 1 PO), , Disp: , Rfl:     Allergies - No Known Allergies    Social History -   Social History     Socioeconomic History     Marital status: Single   Tobacco Use     Smoking status: Never     Smokeless tobacco: Never     Tobacco comments:     Vaping intermittently during pregnancy   Substance and  Sexual Activity     Alcohol use: Not Currently     Drug use: Never     Sexual activity: Yes     Partners: Male       Family History -   Family History   Problem Relation Age of Onset     No Known Problems Mother      Stomach Problem Father      No Known Problems Sister      No Known Problems Brother      No Known Problems Maternal Aunt      No Known Problems Maternal Uncle      Peripheral Neuropathy Maternal Grandfather        Review of Systems - As per HPI and PMHx, otherwise review of system review of the head and neck negative. Otherwise 10+ review of system is negative    Physical Exam  There were no vitals taken for this visit.  BMI: There is no height or weight on file to calculate BMI.    General - The patient is well nourished and well developed, and appears to have good nutritional status.  Alert and oriented to person and place, answers questions and cooperates with examination appropriately.    SKIN - No suspicious lesions or rashes.  Respiration - No respiratory distress.  Head and Face - Normocephalic and atraumatic, with no gross asymmetry noted of the contour of the facial features.  The facial nerve is intact, with strong symmetric movements.    Voice and Breathing - The patient was breathing comfortably without the use of accessory muscles. The patients voice was clear and strong, and had appropriate pitch and quality.    Ears - Bilateral pinna and EACs with normal appearing overlying skin. Tympanic membrane intact with good mobility on pneumatic otoscopy bilaterally. Bony landmarks of the ossicular chain are normal. The tympanic membranes are normal in appearance. No retraction, perforation, or masses.  No fluid or purulence was seen in the external canal or the middle ear.     Eyes - Extraocular movements intact.  Sclera were not icteric or injected, conjunctiva were pink and moist.    Mouth - Examination of the oral cavity showed pink, healthy oral mucosa. No lesions or ulcerations noted.  The  tongue was mobile and midline, and the dentition were in good condition.      Throat - The walls of the oropharynx were smooth, pink, moist, symmetric, and had no lesions or ulcerations.  The tonsillar pillars and soft palate were symmetric.  The uvula was midline on elevation.  Tonsils appear to be 2+ in size with multiple crypts tonsil stones erythema without exudates.    Neck - Normal midline excursion of the laryngotracheal complex during swallowing.  Full range of motion on passive movement.  Palpation of the occipital, submental, submandibular, internal jugular chain, and supraclavicular nodes did not demonstrate any abnormal lymph nodes or masses.  The carotid pulse was palpable bilaterally.  Palpation of the thyroid was soft and smooth, with no nodules or goiter appreciated.  The trachea was mobile and midline.    Nose - External contour is symmetric, no gross deflection or scars.  Nasal mucosa is pink and moist with no abnormal mucus.  The septum was midline and non-obstructive, turbinates of normal size and position.  No polyps, masses, or purulence noted on examination.    Neuro - Nonfocal neuro exam is normal, CN 2 through 12 intact, normal gait and muscle tone.      Performed in clinic today:  No procedures preformed in clinic today      A/P - April Tierra Mehta is a 24 year old female without chronic tonsillitis tonsil stones.  We discussed different treatment options patient is very interested in tonsillectomy.Based on the physical exam and history, my recommendation is for tonsillectomy (with or without adenoidectomy).  The remainder of the visit was spent discussing the risks and benefits of tonsillectomy.      These included:The risks of general anesthesia, bleeding, infection, possible need for emergency surgery to control bleeding, possible alteration of speech and swallowing, and even the possibility of continued throat problems following surgery.They understood and wished to call in and  schedule.       Luis Armando Dawson MD       Again, thank you for allowing me to participate in the care of your patient.        Sincerely,        Luis Armando Dawson MD, MD

## 2024-02-13 NOTE — TELEPHONE ENCOUNTER
Type of surgery: TONSILLECTOMY, ADULT   Location of surgery: [facility]  Date and time of surgery: 4/23  Surgeon: Eunice  Pre-Op Appt Date: 3/27  Post-Op Appt Date: 5/6   Packet sent out: Yes  Pre-cert/Authorization completed:  Not Applicable  Date: na

## 2024-02-13 NOTE — TELEPHONE ENCOUNTER
Patient wants to know if her adenoids can be removed as well.  She states she has been so sick lately and wonders if that will help.    She would like a mychart message back if this is possible or not.  Thank you!!

## 2024-02-14 NOTE — TELEPHONE ENCOUNTER
I called pt and left a msg with the below msg.  Luis Armando Christianson MA, MD Fiedler, Tara, CMA34 minutes ago (12:56 PM)     OF  I can certainly take a look at her adenoids the day of surgery and if inflamed addressed them.  Highly unlikely I will find any adenoid tissue in an adult      Luis Armando Dawson MD

## 2024-02-26 ENCOUNTER — MEDICAL CORRESPONDENCE (OUTPATIENT)
Dept: HEALTH INFORMATION MANAGEMENT | Facility: CLINIC | Age: 24
End: 2024-02-26

## 2024-02-26 ENCOUNTER — OFFICE VISIT (OUTPATIENT)
Dept: FAMILY MEDICINE | Facility: CLINIC | Age: 24
End: 2024-02-26
Payer: COMMERCIAL

## 2024-02-26 VITALS
WEIGHT: 149.4 LBS | HEIGHT: 65 IN | OXYGEN SATURATION: 99 % | HEART RATE: 100 BPM | SYSTOLIC BLOOD PRESSURE: 98 MMHG | RESPIRATION RATE: 18 BRPM | DIASTOLIC BLOOD PRESSURE: 60 MMHG | TEMPERATURE: 97.9 F | BODY MASS INDEX: 24.89 KG/M2

## 2024-02-26 DIAGNOSIS — Z11.59 NEED FOR HEPATITIS C SCREENING TEST: ICD-10-CM

## 2024-02-26 DIAGNOSIS — Z12.4 CERVICAL CANCER SCREENING: ICD-10-CM

## 2024-02-26 DIAGNOSIS — Z00.00 ENCOUNTER FOR ROUTINE ADULT HEALTH EXAMINATION WITHOUT ABNORMAL FINDINGS: Primary | ICD-10-CM

## 2024-02-26 DIAGNOSIS — Z13.220 LIPID SCREENING: ICD-10-CM

## 2024-02-26 LAB
CHOLEST SERPL-MCNC: 156 MG/DL
FASTING STATUS PATIENT QL REPORTED: NO
HCV AB SERPL QL IA: NONREACTIVE
HDLC SERPL-MCNC: 63 MG/DL
LDLC SERPL CALC-MCNC: 79 MG/DL
NONHDLC SERPL-MCNC: 93 MG/DL
TRIGL SERPL-MCNC: 71 MG/DL

## 2024-02-26 PROCEDURE — 36415 COLL VENOUS BLD VENIPUNCTURE: CPT | Performed by: FAMILY MEDICINE

## 2024-02-26 PROCEDURE — 86803 HEPATITIS C AB TEST: CPT | Performed by: FAMILY MEDICINE

## 2024-02-26 PROCEDURE — 99385 PREV VISIT NEW AGE 18-39: CPT | Performed by: FAMILY MEDICINE

## 2024-02-26 PROCEDURE — G0145 SCR C/V CYTO,THINLAYER,RESCR: HCPCS | Performed by: FAMILY MEDICINE

## 2024-02-26 PROCEDURE — 80061 LIPID PANEL: CPT | Performed by: FAMILY MEDICINE

## 2024-02-26 ASSESSMENT — PAIN SCALES - GENERAL: PAINLEVEL: NO PAIN (0)

## 2024-02-26 NOTE — PROGRESS NOTES
Preventive Care Visit  [facility]  Isaiah Castañeda MD, MD, Family Medicine  Feb 26, 2024    Assessment & Plan     (Z00.00) Encounter for routine adult health examination without abnormal findings  (primary encounter diagnosis)  Comment: Doing well, no real concerns.  She had some upper abdominal discomfort just above the bellybutton a few weeks ago, but that is better now.  Plan: No other concerns.    (Z11.59) Need for hepatitis C screening test  Comment: Agreeable to the CDC recommendations for hepatitis C screening  Plan: Hepatitis C antibody        Drawn today.    (Z12.4) Cervical cancer screening  Comment: Due for her Pap smear  Plan: Pap Screen reflex to HPV if ASCUS - recommend         age 25 - 29        Drawn today.  She is in a stable relationship and is getting  in June without a history of STDs so no DNA probe was obtained today.    (Z13.220) Lipid screening  Comment: Agreeable to lipid screening today.  Plan: Lipid panel reflex to direct LDL Fasting        Drawn.    30 minutes spent by me on the date of the encounter doing chart review, history and exam, documentation and further activities per the note      Subjective   April is a 24 year old, presenting for the following:  Physical (pap)        2/26/2024     1:10 PM   Additional Questions   Roomed by Jacqueline   Accompanied by mom and son        Health Care Directive  Patient does not have a Health Care Directive or Living Will: Not discussed today.    Healthy Habits:     Taking medications regularly:  0  History of Present Illness       Reason for visit:  Physical    She eats 0-1 servings of fruits and vegetables daily.She consumes 1 sweetened beverage(s) daily.She exercises with enough effort to increase her heart rate 10 to 19 minutes per day.  She exercises with enough effort to increase her heart rate 3 or less days per week.   She is taking medications regularly.    Continues to lose weight while nursing.   She plans to continue nursing till he is at least 2-year-old.  She is under a little stress, getting ready for her wedding but overall she feels well.    She has had a couple menses now her last was a little bit heavier than normal but the timing of them is normal.           No data to display                   No data to display                   No data to display                      No data to display                     Today's PHQ-2 Score:       2024    12:18 PM   PHQ-2 (  [employer])   Q1: Little interest or pleasure in doing things 0   Q2: Feeling down, depressed or hopeless 0   PHQ-2 Score 0   Q1: Little interest or pleasure in doing things Not at all   Q2: Feeling down, depressed or hopeless Not at all   PHQ-2 Score 0            No data to display              Social History     Tobacco Use    Smoking status: Never    Smokeless tobacco: Never    Tobacco comments:     Vaping intermittently during pregnancy   Substance Use Topics    Alcohol use: Not Currently    Drug use: Never          Mammogram Screening - Patient under 40 years of age: Routine Mammogram Screening not recommended.       History of abnormal Pap smear: NO - age 21-29 PAP every 3 years recommended              No data to display                 Reviewed and updated as needed this visit by Provider                    Past Medical History:   Diagnosis Date    Anxiety      Past Surgical History:   Procedure Laterality Date     SECTION N/A 2023    Procedure:  SECTION;  Surgeon: Isaiah Castañeda MD;  Location: [facility]    wisdom teeth removal       OB History    Para Term  AB Living   1 1 1 0 0 1   SAB IAB Ectopic Multiple Live Births   0 0 0 0 1      # Outcome Date GA Lbr Morgan/2nd Weight Sex Delivery Anes PTL Lv   1 Term 23 42w1d  3.655 kg (8 lb 0.9 oz) M CS-LTranv EPI, Spinal N DONTE      Complications: Failure to Progress in First Stage      Name: DAR LOPEZ-APRIL      Apgar1: 7  Apgar5: 9     BP  "Readings from Last 3 Encounters:   24 98/60   24 110/62   23 129/59    Wt Readings from Last 3 Encounters:   24 67.8 kg (149 lb 6.4 oz)   24 69.1 kg (152 lb 4.8 oz)   23 88 kg (194 lb)                  Patient Active Problem List   Diagnosis    Normal labor    Tonsillitis    Tonsil stone     Past Surgical History:   Procedure Laterality Date     SECTION N/A 2023    Procedure:  SECTION;  Surgeon: Isaiah Castañeda MD;  Location: [facility]    wisdom teeth removal         Social History     Tobacco Use    Smoking status: Never    Smokeless tobacco: Never    Tobacco comments:     Vaping intermittently during pregnancy   Substance Use Topics    Alcohol use: Not Currently     Family History   Problem Relation Age of Onset    No Known Problems Mother     Stomach Problem Father     No Known Problems Sister     No Known Problems Brother     Peripheral Neuropathy Maternal Grandfather     No Known Problems Maternal Aunt     No Known Problems Maternal Uncle          Current Outpatient Medications   Medication Sig Dispense Refill    Bioflavonoid Products (VITAMIN C) CHEW       Multiple Vitamins-Minerals (MULTIVITAL PO)       acetaminophen (TYLENOL) 500 MG tablet Take 2 tablets (1,000 mg) by mouth every 6 hours      ibuprofen (ADVIL/MOTRIN) 800 MG tablet Take 1 tablet (800 mg) by mouth every 6 hours 90 tablet 1    Prenatal MV-Min-Fe Fum-FA-DHA (PRENATAL 1 PO)  (Patient not taking: Reported on 2024)       No Known Allergies  No lab results found.       Review of Systems  Constitutional, HEENT, cardiovascular, pulmonary, gi and gu systems are negative, except as otherwise noted.     Objective    Exam  BP 98/60 (Cuff Size: Adult Regular)   Pulse 100   Temp 97.9  F (36.6  C) (Temporal)   Resp 18   Ht 1.651 m (5' 5\")   Wt 67.8 kg (149 lb 6.4 oz)   LMP 2024 (Approximate)   SpO2 99%   Breastfeeding Yes   BMI 24.86 kg/m     Estimated body mass index is 24.86 " "kg/m  as calculated from the following:    Height as of this encounter: 1.651 m (5' 5\").    Weight as of this encounter: 67.8 kg (149 lb 6.4 oz).    Physical Exam  GENERAL: alert and no distress  EYES: Eyes grossly normal to inspection, PERRL and conjunctivae and sclerae normal  HENT: ear canals and TM's normal, nose and mouth without ulcers or lesions  NECK: no adenopathy, no asymmetry, masses, or scars  RESP: lungs clear to auscultation - no rales, rhonchi or wheezes  BREAST: No breast exam done, we discussed self breast awareness and what to be concerned about, ie; retraction of a nipple, dimpling of the skin or any nipple discharge or aerolar rash that does not clear.   CV: regular rate and rhythm, normal S1 S2, no S3 or S4, no murmur, click or rub, no peripheral edema  ABDOMEN: soft, nontender, no hepatosplenomegaly, no masses and bowel sounds normal   (female): Normal external genitalia.  Cervix appears nulliparous, she is status post a  for failure to progress.  Pap smear was obtained without incidence.  No bimanual exam was indicated.  No abnormal discharge.  MS: no gross musculoskeletal defects noted, no edema  SKIN: no suspicious lesions or rashes  NEURO: Normal strength and tone, mentation intact and speech normal  PSYCH: mentation appears normal, affect normal/bright    Electronically signed by:  Isaiah Castañeda M.D.  2024      "

## 2024-03-02 LAB
BKR LAB AP GYN ADEQUACY: NORMAL
BKR LAB AP GYN INTERPRETATION: NORMAL
BKR LAB AP HPV REFLEX: NORMAL
BKR LAB AP LMP: NORMAL
BKR LAB AP PREVIOUS ABNORMAL: NORMAL
PATH REPORT.COMMENTS IMP SPEC: NORMAL
PATH REPORT.COMMENTS IMP SPEC: NORMAL
PATH REPORT.RELEVANT HX SPEC: NORMAL

## 2024-03-27 ENCOUNTER — OFFICE VISIT (OUTPATIENT)
Dept: FAMILY MEDICINE | Facility: CLINIC | Age: 24
End: 2024-03-27
Payer: COMMERCIAL

## 2024-03-27 VITALS
HEART RATE: 80 BPM | BODY MASS INDEX: 25.39 KG/M2 | OXYGEN SATURATION: 98 % | DIASTOLIC BLOOD PRESSURE: 64 MMHG | SYSTOLIC BLOOD PRESSURE: 96 MMHG | TEMPERATURE: 97.4 F | RESPIRATION RATE: 16 BRPM | HEIGHT: 65 IN | WEIGHT: 152.4 LBS

## 2024-03-27 DIAGNOSIS — Z01.818 PREOP GENERAL PHYSICAL EXAM: Primary | ICD-10-CM

## 2024-03-27 DIAGNOSIS — J35.8 TONSIL STONE: ICD-10-CM

## 2024-03-27 DIAGNOSIS — J03.90 TONSILLITIS: ICD-10-CM

## 2024-03-27 PROCEDURE — 99214 OFFICE O/P EST MOD 30 MIN: CPT | Performed by: FAMILY MEDICINE

## 2024-03-27 ASSESSMENT — PAIN SCALES - GENERAL: PAINLEVEL: NO PAIN (0)

## 2024-03-27 NOTE — PROGRESS NOTES
Preoperative Evaluation  [address] [address]  Phone: [phone]  Fax: [phone]  Primary Provider: Vicenta Puente  Pre-op Performing Provider: VICENTA PUENTE  Mar 27, 2024       April is a 24 year old, presenting for the following:  Pre-Op Exam        3/27/2024    12:54 PM   Additional Questions   Roomed by Clarisa SCOTT     Surgical Information  Surgery/Procedure: TONSILLECTOMY, ADULT   Surgery Location: [facility]  Surgeon: Luis Armando Dawson MD   Surgery Date: 04/23/2024  Time of Surgery: 1:40 PM  Where patient plans to recover: At home with family  Fax number for surgical facility: Note does not need to be faxed, will be available electronically in Epic.    Assessment & Plan     The proposed surgical procedure is considered LOW risk.    (Z01.818) Preop general physical exam  (primary encounter diagnosis)  (J03.90) Recurrent Tonsillitis  (J35.8) Recurrent Tonsil stones  Comment: Scheduled for tonsillectomy on April 23, 2024 here at [facility].  Plan: Patient was instructed on n.p.o. for solids for 8 hours prior to surgery and clear liquids 2 hours prior to her OR time.  She was given the Ensure clear preop drink to complete prior to leaving the house and will be to the hospital 2 hours prior to her surgery.        - No identified additional risk factors other than previously addressed    Antiplatelet or Anticoagulation Medication Instructions   - Patient is on no antiplatelet or anticoagulation medications.    Additional Medication Instructions  Patient is on no additional chronic medications    Recommendation  APPROVAL GIVEN to proceed with proposed procedure, without further diagnostic evaluation.    Review of prior external note(s) from - ENT  35 minutes spent by me on the date of the encounter doing chart review, history and exam, documentation and further activities per the note    Subjective       HPI related to upcoming procedure: Patient with  a history of recurrent tonsil infections and recurrent tonsillar stones.  The tonsil stones give her some chronic halitosis and she is hacking up and trying to remove these foul-smelling tonsillar stones on a regular basis.  ENT has agreed to remove her tonsils due to these recurrent problems.  She is scheduled for 4/23/2024.      3/27/2024    10:17 AM   Preop Questions   1. Have you ever had a heart attack or stroke? No   2. Have you ever had surgery on your heart or blood vessels, such as a stent placement, a coronary artery bypass, or surgery on an artery in your head, neck, heart, or legs? No   3. Do you have chest pain with activity? No   4. Do you have a history of  heart failure? No   5. Do you currently have a cold, bronchitis or symptoms of other infection? No   6. Do you have a cough, shortness of breath, or wheezing? No   7. Do you or anyone in your family have previous history of blood clots? No   8. Do you or does anyone in your family have a serious bleeding problem such as prolonged bleeding following surgeries or cuts? No   9. Have you ever had problems with anemia or been told to take iron pills? No   10. Have you had any abnormal blood loss such as black, tarry or bloody stools, or abnormal vaginal bleeding? No   11. Have you ever had a blood transfusion? No   12. Are you willing to have a blood transfusion if it is medically needed before, during, or after your surgery? Yes   13. Have you or any of your relatives ever had problems with anesthesia? UNKNOWN -she has had anesthesia for her wisdom teeth he was a little slow waking up but otherwise had no problems.   14. Do you have sleep apnea, excessive snoring or daytime drowsiness? No   15. Do you have any artifical heart valves or other implanted medical devices like a pacemaker, defibrillator, or continuous glucose monitor? No   16. Do you have artificial joints? No   17. Are you allergic to latex? No   18. Is there any chance that you may be  pregnant? No       Health Care Directive  Patient does not have a Health Care Directive or Living Will: Not on file.    Preoperative Review of    reviewed - no record of controlled substances prescribed.      Patient Active Problem List    Diagnosis Date Noted    Tonsillitis 10/18/2023     Priority: Medium    Tonsil stone 10/18/2023     Priority: Medium    Normal labor 2023     Priority: Medium      Past Medical History:   Diagnosis Date    Anxiety      Past Surgical History:   Procedure Laterality Date     SECTION N/A 2023    Procedure:  SECTION;  Surgeon: Isaiah Castañeda MD;  Location: [facility]    wisdom teeth removal       Current Outpatient Medications   Medication Sig Dispense Refill    Bioflavonoid Products (VITAMIN C) CHEW       Multiple Vitamins-Minerals (MULTIVITAL PO)       Prenatal MV-Min-Fe Fum-FA-DHA (PRENATAL 1 PO)  (Patient not taking: Reported on 2024)         No Known Allergies     Social History     Tobacco Use    Smoking status: Never    Smokeless tobacco: Never    Tobacco comments:     Vaping intermittently during pregnancy   Substance Use Topics    Alcohol use: Not Currently     Family History   Problem Relation Age of Onset    No Known Problems Mother     Stomach Problem Father     No Known Problems Sister     No Known Problems Brother     Peripheral Neuropathy Maternal Grandfather     No Known Problems Maternal Aunt     No Known Problems Maternal Uncle      History   Drug Use, never Unknown     Social History     Socioeconomic History    Marital status: Single     Spouse name: Not on file    Number of children: Not on file    Years of education: Not on file    Highest education level: Not on file   Occupational History    Not on file   Tobacco Use    Smoking status: Never    Smokeless tobacco: Never    Tobacco comments:     Vaping intermittently during pregnancy   Vaping Use    Vaping Use: Every day    Substances: Nicotine, Flavoring    Devices:  "Disposable, Pre-filled pod   Substance and Sexual Activity    Alcohol use: Not Currently    Drug use: Never    Sexual activity: Yes     Partners: Male   Other Topics Concern    Not on file   Social History Narrative    Not on file     Social Determinants of Health     Financial Resource Strain: Not on file   Food Insecurity: Not on file   Transportation Needs: Not on file   Physical Activity: Not on file   Stress: Not on file   Social Connections: Not on file   Interpersonal Safety: Low Risk  (3/27/2024)    Interpersonal Safety     Do you feel physically and emotionally safe where you currently live?: Yes     Within the past 12 months, have you been hit, slapped, kicked or otherwise physically hurt by someone?: No     Within the past 12 months, have you been humiliated or emotionally abused in other ways by your partner or ex-partner?: No   Housing Stability: Not on file            Review of Systems    Review of Systems  Constitutional, neuro, ENT, endocrine, pulmonary, cardiac, gastrointestinal, genitourinary, musculoskeletal, integument and psychiatric systems are negative, except as otherwise noted.    Objective    BP 96/64   Pulse 80   Temp 97.4  F (36.3  C) (Temporal)   Resp 16   Ht 1.651 m (5' 5\")   Wt 69.1 kg (152 lb 6.4 oz)   LMP 03/11/2024   SpO2 98%   BMI 25.36 kg/m     Estimated body mass index is 25.36 kg/m  as calculated from the following:    Height as of this encounter: 1.651 m (5' 5\").    Weight as of this encounter: 69.1 kg (152 lb 6.4 oz).  Physical Exam  GENERAL: alert and no distress  EYES: Eyes grossly normal to inspection, PERRL and conjunctivae and sclerae normal  HENT: ear canals and TM's normal, nose and mouth without ulcers or lesions  NECK: no adenopathy, no asymmetry, masses, or scars  RESP: lungs clear to auscultation - no rales, rhonchi or wheezes  CV: regular rate and rhythm, normal S1 S2, no S3 or S4, no murmur, click or rub, no peripheral edema  ABDOMEN: soft, nontender, no " hepatosplenomegaly, no masses and bowel sounds normal  MS: no gross musculoskeletal defects noted, no edema  SKIN: no suspicious lesions or rashes  NEURO: Normal strength and tone, mentation intact and speech normal  PSYCH: mentation appears normal, affect normal/bright         Diagnostics  No labs indicated     No EKG required, no history of coronary heart disease, significant arrhythmia, peripheral arterial disease or other structural heart disease.    Revised Cardiac Risk Index (RCRI)  The patient has the following serious cardiovascular risks for perioperative complications:   - No serious cardiac risks = 0 points     RCRI Interpretation: 0 points: Class I (very low risk - 0.4% complication rate)    Electronically signed by:  Isaiah Castañeda M.D.  3/27/2024    Copy of this evaluation report is provided to requesting physician.

## 2024-04-23 ENCOUNTER — HOSPITAL ENCOUNTER (OUTPATIENT)
Facility: CLINIC | Age: 24
Discharge: HOME | End: 2024-04-23
Attending: OTOLARYNGOLOGY | Admitting: OTOLARYNGOLOGY
Payer: COMMERCIAL

## 2024-04-23 ENCOUNTER — ANESTHESIA (OUTPATIENT)
Dept: SURGERY | Facility: CLINIC | Age: 24
End: 2024-04-23
Payer: COMMERCIAL

## 2024-04-23 ENCOUNTER — ANESTHESIA EVENT (OUTPATIENT)
Dept: SURGERY | Facility: CLINIC | Age: 24
End: 2024-04-23
Payer: COMMERCIAL

## 2024-04-23 VITALS
TEMPERATURE: 97 F | SYSTOLIC BLOOD PRESSURE: 115 MMHG | OXYGEN SATURATION: 96 % | DIASTOLIC BLOOD PRESSURE: 72 MMHG | BODY MASS INDEX: 25.36 KG/M2 | HEART RATE: 93 BPM | WEIGHT: 152.4 LBS | RESPIRATION RATE: 22 BRPM

## 2024-04-23 DIAGNOSIS — Z98.890 POSTOPERATIVE NAUSEA: ICD-10-CM

## 2024-04-23 DIAGNOSIS — G89.18 POSTOPERATIVE PAIN: Primary | ICD-10-CM

## 2024-04-23 DIAGNOSIS — R11.0 POSTOPERATIVE NAUSEA: ICD-10-CM

## 2024-04-23 PROCEDURE — 370N000017 HC ANESTHESIA TECHNICAL FEE, PER MIN: Performed by: OTOLARYNGOLOGY

## 2024-04-23 PROCEDURE — 360N000075 HC SURGERY LEVEL 2, PER MIN: Performed by: OTOLARYNGOLOGY

## 2024-04-23 PROCEDURE — 999N000141 HC STATISTIC PRE-PROCEDURE NURSING ASSESSMENT: Performed by: OTOLARYNGOLOGY

## 2024-04-23 PROCEDURE — 710N000012 HC RECOVERY PHASE 2, PER MINUTE: Performed by: OTOLARYNGOLOGY

## 2024-04-23 PROCEDURE — 250N000011 HC RX IP 250 OP 636: Performed by: OTOLARYNGOLOGY

## 2024-04-23 PROCEDURE — 88304 TISSUE EXAM BY PATHOLOGIST: CPT | Mod: 26 | Performed by: PATHOLOGY

## 2024-04-23 PROCEDURE — 250N000011 HC RX IP 250 OP 636: Performed by: NURSE ANESTHETIST, CERTIFIED REGISTERED

## 2024-04-23 PROCEDURE — 258N000003 HC RX IP 258 OP 636: Performed by: NURSE ANESTHETIST, CERTIFIED REGISTERED

## 2024-04-23 PROCEDURE — 710N000010 HC RECOVERY PHASE 1, LEVEL 2, PER MIN: Performed by: OTOLARYNGOLOGY

## 2024-04-23 PROCEDURE — 272N000001 HC OR GENERAL SUPPLY STERILE: Performed by: OTOLARYNGOLOGY

## 2024-04-23 PROCEDURE — 88304 TISSUE EXAM BY PATHOLOGIST: CPT | Mod: TC,XS | Performed by: OTOLARYNGOLOGY

## 2024-04-23 PROCEDURE — 42821 REMOVE TONSILS AND ADENOIDS: CPT | Performed by: OTOLARYNGOLOGY

## 2024-04-23 PROCEDURE — 250N000013 HC RX MED GY IP 250 OP 250 PS 637: Performed by: OTOLARYNGOLOGY

## 2024-04-23 PROCEDURE — 250N000009 HC RX 250: Performed by: NURSE ANESTHETIST, CERTIFIED REGISTERED

## 2024-04-23 PROCEDURE — 250N000025 HC SEVOFLURANE, PER MIN: Performed by: OTOLARYNGOLOGY

## 2024-04-23 RX ORDER — ALBUTEROL SULFATE 0.83 MG/ML
2.5 SOLUTION RESPIRATORY (INHALATION) EVERY 4 HOURS PRN
Status: DISCONTINUED | OUTPATIENT
Start: 2024-04-23 | End: 2024-04-23 | Stop reason: HOSPADM

## 2024-04-23 RX ORDER — DIMENHYDRINATE 50 MG/ML
INJECTION, SOLUTION INTRAMUSCULAR; INTRAVENOUS PRN
Status: DISCONTINUED | OUTPATIENT
Start: 2024-04-23 | End: 2024-04-23

## 2024-04-23 RX ORDER — OXYCODONE HCL 5 MG/5 ML
5 SOLUTION, ORAL ORAL EVERY 6 HOURS PRN
Status: DISCONTINUED | OUTPATIENT
Start: 2024-04-23 | End: 2024-04-23 | Stop reason: HOSPADM

## 2024-04-23 RX ORDER — HYDROMORPHONE HYDROCHLORIDE 1 MG/ML
0.4 INJECTION, SOLUTION INTRAMUSCULAR; INTRAVENOUS; SUBCUTANEOUS EVERY 5 MIN PRN
Status: DISCONTINUED | OUTPATIENT
Start: 2024-04-23 | End: 2024-04-23 | Stop reason: HOSPADM

## 2024-04-23 RX ORDER — ONDANSETRON 2 MG/ML
4 INJECTION INTRAMUSCULAR; INTRAVENOUS EVERY 30 MIN PRN
Status: DISCONTINUED | OUTPATIENT
Start: 2024-04-23 | End: 2024-04-23 | Stop reason: HOSPADM

## 2024-04-23 RX ORDER — FENTANYL CITRATE 50 UG/ML
50 INJECTION, SOLUTION INTRAMUSCULAR; INTRAVENOUS EVERY 5 MIN PRN
Status: DISCONTINUED | OUTPATIENT
Start: 2024-04-23 | End: 2024-04-23 | Stop reason: HOSPADM

## 2024-04-23 RX ORDER — BUPIVACAINE HYDROCHLORIDE 2.5 MG/ML
INJECTION, SOLUTION INFILTRATION; PERINEURAL PRN
Status: DISCONTINUED | OUTPATIENT
Start: 2024-04-23 | End: 2024-04-23 | Stop reason: HOSPADM

## 2024-04-23 RX ORDER — LIDOCAINE 40 MG/G
CREAM TOPICAL
Status: DISCONTINUED | OUTPATIENT
Start: 2024-04-23 | End: 2024-04-23 | Stop reason: HOSPADM

## 2024-04-23 RX ORDER — HYDROCODONE BITARTRATE AND ACETAMINOPHEN 7.5; 325 MG/15ML; MG/15ML
7.5 SOLUTION ORAL 3 TIMES DAILY PRN
Qty: 100 ML | Refills: 0 | Status: SHIPPED | OUTPATIENT
Start: 2024-04-23 | End: 2024-04-26

## 2024-04-23 RX ORDER — SODIUM CHLORIDE, SODIUM LACTATE, POTASSIUM CHLORIDE, CALCIUM CHLORIDE 600; 310; 30; 20 MG/100ML; MG/100ML; MG/100ML; MG/100ML
INJECTION, SOLUTION INTRAVENOUS CONTINUOUS
Status: DISCONTINUED | OUTPATIENT
Start: 2024-04-23 | End: 2024-04-23 | Stop reason: HOSPADM

## 2024-04-23 RX ORDER — PROPOFOL 10 MG/ML
INJECTION, EMULSION INTRAVENOUS PRN
Status: DISCONTINUED | OUTPATIENT
Start: 2024-04-23 | End: 2024-04-23

## 2024-04-23 RX ORDER — LIDOCAINE HYDROCHLORIDE 20 MG/ML
INJECTION, SOLUTION INFILTRATION; PERINEURAL PRN
Status: DISCONTINUED | OUTPATIENT
Start: 2024-04-23 | End: 2024-04-23

## 2024-04-23 RX ORDER — HYDRALAZINE HYDROCHLORIDE 20 MG/ML
2.5-5 INJECTION INTRAMUSCULAR; INTRAVENOUS EVERY 10 MIN PRN
Status: DISCONTINUED | OUTPATIENT
Start: 2024-04-23 | End: 2024-04-23 | Stop reason: HOSPADM

## 2024-04-23 RX ORDER — ONDANSETRON 4 MG/1
4 TABLET, ORALLY DISINTEGRATING ORAL EVERY 30 MIN PRN
Status: DISCONTINUED | OUTPATIENT
Start: 2024-04-23 | End: 2024-04-23 | Stop reason: HOSPADM

## 2024-04-23 RX ORDER — FENTANYL CITRATE 50 UG/ML
INJECTION, SOLUTION INTRAMUSCULAR; INTRAVENOUS PRN
Status: DISCONTINUED | OUTPATIENT
Start: 2024-04-23 | End: 2024-04-23

## 2024-04-23 RX ORDER — MEPERIDINE HYDROCHLORIDE 25 MG/ML
12.5 INJECTION INTRAMUSCULAR; INTRAVENOUS; SUBCUTANEOUS EVERY 5 MIN PRN
Status: DISCONTINUED | OUTPATIENT
Start: 2024-04-23 | End: 2024-04-23 | Stop reason: HOSPADM

## 2024-04-23 RX ORDER — NALOXONE HYDROCHLORIDE 0.4 MG/ML
0.1 INJECTION, SOLUTION INTRAMUSCULAR; INTRAVENOUS; SUBCUTANEOUS
Status: DISCONTINUED | OUTPATIENT
Start: 2024-04-23 | End: 2024-04-23 | Stop reason: HOSPADM

## 2024-04-23 RX ORDER — ONDANSETRON 4 MG/1
4 TABLET, ORALLY DISINTEGRATING ORAL EVERY 8 HOURS PRN
Qty: 10 TABLET | Refills: 0 | Status: SHIPPED | OUTPATIENT
Start: 2024-04-23 | End: 2024-05-08

## 2024-04-23 RX ORDER — ONDANSETRON 2 MG/ML
INJECTION INTRAMUSCULAR; INTRAVENOUS PRN
Status: DISCONTINUED | OUTPATIENT
Start: 2024-04-23 | End: 2024-04-23

## 2024-04-23 RX ORDER — OXYCODONE HCL 5 MG/5 ML
5 SOLUTION, ORAL ORAL EVERY 6 HOURS PRN
Qty: 60 ML | Refills: 0 | Status: SHIPPED | OUTPATIENT
Start: 2024-04-23 | End: 2024-04-26

## 2024-04-23 RX ORDER — DEXAMETHASONE SODIUM PHOSPHATE 10 MG/ML
10 INJECTION, SOLUTION INTRAMUSCULAR; INTRAVENOUS ONCE
Status: COMPLETED | OUTPATIENT
Start: 2024-04-23 | End: 2024-04-23

## 2024-04-23 RX ORDER — HYDROXYZINE HYDROCHLORIDE 25 MG/1
25 TABLET, FILM COATED ORAL EVERY 6 HOURS PRN
Status: DISCONTINUED | OUTPATIENT
Start: 2024-04-23 | End: 2024-04-23 | Stop reason: HOSPADM

## 2024-04-23 RX ORDER — METOPROLOL TARTRATE 1 MG/ML
1-2 INJECTION, SOLUTION INTRAVENOUS EVERY 5 MIN PRN
Status: DISCONTINUED | OUTPATIENT
Start: 2024-04-23 | End: 2024-04-23 | Stop reason: HOSPADM

## 2024-04-23 RX ORDER — METHYLPREDNISOLONE 4 MG
TABLET, DOSE PACK ORAL
Qty: 21 TABLET | Refills: 0 | Status: SHIPPED | OUTPATIENT
Start: 2024-04-23 | End: 2024-05-08

## 2024-04-23 RX ADMIN — FENTANYL CITRATE 50 MCG: 50 INJECTION INTRAMUSCULAR; INTRAVENOUS at 14:53

## 2024-04-23 RX ADMIN — FENTANYL CITRATE 50 MCG: 50 INJECTION, SOLUTION INTRAMUSCULAR; INTRAVENOUS at 15:46

## 2024-04-23 RX ADMIN — SODIUM CHLORIDE, POTASSIUM CHLORIDE, SODIUM LACTATE AND CALCIUM CHLORIDE: 600; 310; 30; 20 INJECTION, SOLUTION INTRAVENOUS at 13:42

## 2024-04-23 RX ADMIN — FENTANYL CITRATE 50 MCG: 50 INJECTION INTRAMUSCULAR; INTRAVENOUS at 14:17

## 2024-04-23 RX ADMIN — ROCURONIUM BROMIDE 50 MG: 50 INJECTION, SOLUTION INTRAVENOUS at 14:25

## 2024-04-23 RX ADMIN — FENTANYL CITRATE 50 MCG: 50 INJECTION, SOLUTION INTRAMUSCULAR; INTRAVENOUS at 15:31

## 2024-04-23 RX ADMIN — DEXAMETHASONE SODIUM PHOSPHATE 10 MG: 10 INJECTION, SOLUTION INTRAMUSCULAR; INTRAVENOUS at 14:30

## 2024-04-23 RX ADMIN — MIDAZOLAM 2 MG: 1 INJECTION INTRAMUSCULAR; INTRAVENOUS at 14:17

## 2024-04-23 RX ADMIN — ONDANSETRON 4 MG: 2 INJECTION INTRAMUSCULAR; INTRAVENOUS at 14:53

## 2024-04-23 RX ADMIN — DIMENHYDRINATE 25 MG: 50 INJECTION, SOLUTION INTRAMUSCULAR; INTRAVENOUS at 14:32

## 2024-04-23 RX ADMIN — LIDOCAINE HYDROCHLORIDE 50 MG: 20 INJECTION, SOLUTION INFILTRATION; PERINEURAL at 14:24

## 2024-04-23 RX ADMIN — PROPOFOL 200 MG: 10 INJECTION, EMULSION INTRAVENOUS at 14:25

## 2024-04-23 RX ADMIN — OXYCODONE HYDROCHLORIDE 5 MG: 5 SOLUTION ORAL at 16:01

## 2024-04-23 RX ADMIN — SUGAMMADEX 200 MG: 100 INJECTION, SOLUTION INTRAVENOUS at 15:11

## 2024-04-23 RX ADMIN — FENTANYL CITRATE 50 MCG: 50 INJECTION, SOLUTION INTRAMUSCULAR; INTRAVENOUS at 15:39

## 2024-04-23 RX ADMIN — DEXMEDETOMIDINE HYDROCHLORIDE 10 MCG: 100 INJECTION, SOLUTION INTRAVENOUS at 15:15

## 2024-04-23 ASSESSMENT — ACTIVITIES OF DAILY LIVING (ADL)
ADLS_ACUITY_SCORE: 24
ADLS_ACUITY_SCORE: 22
ADLS_ACUITY_SCORE: 24
ADLS_ACUITY_SCORE: 24

## 2024-04-23 ASSESSMENT — LIFESTYLE VARIABLES: TOBACCO_USE: 0

## 2024-04-23 NOTE — ANESTHESIA POSTPROCEDURE EVALUATION
Patient: Mariya Mehta    Procedure: Procedure(s):  Bilateral Tonsillectomy and Adenoidectomy, ADULT       Anesthesia Type:  General    Note:  Disposition: Outpatient   Postop Pain Control: Uneventful            Sign Out: Well controlled pain   PONV: No   Neuro/Psych: Uneventful            Sign Out: Acceptable/Baseline neuro status   Airway/Respiratory: Uneventful            Sign Out: Acceptable/Baseline resp. status   CV/Hemodynamics: Uneventful            Sign Out: Acceptable CV status   Other NRE: NONE   DID A NON-ROUTINE EVENT OCCUR? No    Event details/Postop Comments:  Pt was happy with anesthesia care.  No complications.  I will follow up with the pt if needed.           Last vitals:  Vitals Value Taken Time   /72 04/23/24 1600   Temp 96.98  F (36.1  C) 04/23/24 1603   Pulse 90 04/23/24 1603   Resp 18 04/23/24 1603   SpO2 94 % 04/23/24 1603   Vitals shown include unfiled device data.    Electronically Signed By: CAMI Fuentes CRNA  April 23, 2024  4:51 PM

## 2024-04-23 NOTE — DISCHARGE INSTRUCTIONS
HOME CARE INSTRUCTIONS FOR PATIENTS WHO HAVE HAD A TONSILLECTOMY/TONSILLECTOMY AND ADENOIDECTOMY (T&A)  [phone]    HOME CARE INSTRUCTIONS        DIET INSTRUCTIONS:  1.   The patient should be encouraged to drink liquids as much as possible the same day of surgery; however, avoid citrus juices, as they will cause throat pain.    2.   For the first day or two at home, ginger ale, broth, popsicles, Jell-O, and soft cooked eggs are recommended for eating.  Then allow the patient to progress to a soft diet at his or her own pace. Avoid foods that are hard, crumble, have small pieces, or have sharp edges. Avoid citrus juices for 2 weeks.    3.   For the first 14 days, drink plenty of fluids, water, ginger ale, and apple juice. Avoid citrus fruit juices, such as orange juice and grapefruit: hot fluids: rough vegetables such as raw vegetables, corn chips, peanuts, popcorn, and highly spiced foods.              ACTIVITES:  The patient should have many short rest periods during the first three days at home.    Return to school or work approximately 10 days after discharge from the hospital.    Avoid vigorous activity and exercise of any kind for the full 14 days following surgery.    Do not leave town for 14 days, i.e. stay within a 30-minute driving distance of the hospital where surgery was done.             Other things to avoid:  People with colds.  Aspirin, including Aspergum; Advil, Motrin, Ibuprofen, Aleve, Naproxen and similar medications.  Use only Tylenol or your prescribed pain medication as directed.        If bleeding occurs at any time call your doctor's office  at [phone] and/or go to the Emergency department where your surgery was performed.    If patient temperature rises to 101 degrees and does not come down with Tylenol call our office.    Chloroseptic throat spray may be used in the throat to help alleviate pain.    The patient s stool may be dark or black for the first two days  following surgery. Do not let this alarm you.    PAIN MEDICATION WILL NOT BE FILLED AFTER NORMAL CLINIC HOURS OR ON WEEKENDS.    If any questions please call the doctor's office at [phone].       Your prescription for Magic Mouthwash will be ready in retail pharmacy tomorrow.  If you would like to have the prescription transferred to a pharmacy closer to home, please call [phone], to discussed transferring the prescription to another pharmacy.

## 2024-04-23 NOTE — ANESTHESIA PREPROCEDURE EVALUATION
Anesthesia Pre-Procedure Evaluation    Patient: Mariya Mehta   MRN: [id number] : 2000        Procedure : Procedure(s):  TONSILLECTOMY, ADULT          Past Medical History:   Diagnosis Date     Anxiety       Past Surgical History:   Procedure Laterality Date      SECTION N/A 2023    Procedure:  SECTION;  Surgeon: Isaiah Castañeda MD;  Location: [facility]     wisdom teeth removal        No Known Allergies   Social History     Tobacco Use     Smoking status: Never     Smokeless tobacco: Never     Tobacco comments:     Vaping intermittently during pregnancy   Substance Use Topics     Alcohol use: Not Currently      Wt Readings from Last 1 Encounters:   24 69.1 kg (152 lb 6.4 oz)        Anesthesia Evaluation   Pt has not had prior anesthetic         ROS/MED HX  ENT/Pulmonary: Comment: Enlarged tonsils with stones   (-) tobacco use   Neurologic:  - neg neurologic ROS     Cardiovascular:  - neg cardiovascular ROS   (+)  - -   -  - -                                 No previous cardiac testing     METS/Exercise Tolerance:     Hematologic:  - neg hematologic  ROS     Musculoskeletal:  - neg musculoskeletal ROS     GI/Hepatic:  - neg GI/hepatic ROS  (-) GERD   Renal/Genitourinary:  - neg Renal ROS     Endo:  - neg endo ROS     Psychiatric/Substance Use:     (+) psychiatric history anxiety       Infectious Disease:  - neg infectious disease ROS     Malignancy:  - neg malignancy ROS     Other:  - neg other ROS          Physical Exam    Airway        Mallampati: II   TM distance: > 3 FB   Neck ROM: full   Mouth opening: > 3 cm    Respiratory Devices and Support         Dental       (+) Completely normal teeth      Cardiovascular   cardiovascular exam normal       Rhythm and rate: regular and normal     Pulmonary   pulmonary exam normal        breath sounds clear to auscultation         OUTSIDE LABS:  CBC:   Lab Results   Component Value Date    WBC 16.4 (H) 2023    HGB 9.7 (L) 07/15/2023  "   HGB 11.5 (L) 07/13/2023    HCT 35.2 07/13/2023     07/13/2023     BMP: No results found for: \"NA\", \"POTASSIUM\", \"CHLORIDE\", \"CO2\", \"BUN\", \"CR\", \"GLC\"  COAGS: No results found for: \"PTT\", \"INR\", \"FIBR\"  POC: No results found for: \"BGM\", \"HCG\", \"HCGS\"  HEPATIC: No results found for: \"ALBUMIN\", \"PROTTOTAL\", \"ALT\", \"AST\", \"GGT\", \"ALKPHOS\", \"BILITOTAL\", \"BILIDIRECT\", \"MEHUL\"  OTHER: No results found for: \"PH\", \"LACT\", \"A1C\", \"PAIGE\", \"PHOS\", \"MAG\", \"LIPASE\", \"AMYLASE\", \"TSH\", \"T4\", \"T3\", \"CRP\", \"SED\"    Anesthesia Plan    ASA Status:  2    NPO Status:  NPO Appropriate    Anesthesia Type: General.     - Airway: ETT   Induction: Intravenous, Propofol.   Maintenance: Balanced.        Consents    Anesthesia Plan(s) and associated risks, benefits, and realistic alternatives discussed. Questions answered and patient/representative(s) expressed understanding.     - Discussed:     - Discussed with:  Patient      - Extended Intubation/Ventilatory Support Discussed: No.      - Patient is DNR/DNI Status: No     Use of blood products discussed: No .     Postoperative Care    Pain management: IV analgesics, Oral pain medications.   PONV prophylaxis: Ondansetron (or other 5HT-3), Meclizine or Dimenhydrinate, Dexamethasone or Solumedrol     Comments:    Other Comments: The risks and benefits of anesthesia, and the alternatives where applicable, have been discussed with the patient, and they wish to proceed.           CAMI Fuentes CRNA    I have reviewed the pertinent notes and labs in the chart from the past 30 days and (re)examined the patient.  Any updates or changes from those notes are reflected in this note.              # Overweight: Estimated body mass index is 25.36 kg/m  as calculated from the following:    Height as of 3/27/24: 1.651 m (5' 5\").    Weight as of this encounter: 69.1 kg (152 lb 6.4 oz).      "

## 2024-04-23 NOTE — ANESTHESIA CARE TRANSFER NOTE
Patient: April N Janine    Procedure: Procedure(s):  Bilateral Tonsillectomy and Adenoidectomy, ADULT       Diagnosis: Chronic tonsillitis [J35.01]  Tonsil stone [J35.8]  Diagnosis Additional Information: No value filed.    Anesthesia Type:   General     Note:    Oropharynx: oropharynx clear of all foreign objects and spontaneously breathing  Level of Consciousness: drowsy  Oxygen Supplementation: face mask    Independent Airway: airway patency satisfactory and stable  Dentition: dentition unchanged  Vital Signs Stable: post-procedure vital signs reviewed and stable  Report to RN Given: handoff report given  Patient transferred to: PACU    Handoff Report: Identifed the Patient, Identified the Reponsible Provider, Reviewed the pertinent medical history, Discussed the surgical course, Reviewed Intra-OP anesthesia mangement and issues during anesthesia, Set expectations for post-procedure period and Allowed opportunity for questions and acknowledgement of understanding      Vitals:  Vitals Value Taken Time   BP     Temp     Pulse     Resp     SpO2         Electronically Signed By: CAMI Fuentes CRNA  April 23, 2024  3:23 PM

## 2024-04-23 NOTE — OP NOTE
OTOLARYNGOLOGY OPERATIVE NOTE    SURGEON:  Luis Armando Dawson MD      ASSISTANT: NONE     PREOPERATIVE DIAGNOSIS:  Chronic tonsillitis and possible and tonsil stones adenoiditis.      POSTOPERATIVE DIAGNOSIS:  Chronic tonsillitis and adenoiditis.      PROCEDURE:  Tonsillectomy and adenoidectomy.      INDICATIONS:  As above.      SURGICAL FINDINGS:  AS ABOVE    Date: 4/23/2024    BRIEF HISTORY: Patient is an 24 year old year old with a history of chronic tonsillitis concerns today about adenoiditis and especially with a history of strep throats recently.    The patient understands the risks and benefits of the surgery (tonsillectomy and possible adenoidectomy), alternatives, limitations, complications including but not limited to bleeding, infection, nasopharyngeal stenosis, oropharyngeal stenosis, bleeding severe enough to necessitate reoperation, risks related to anesthesia amongst others.  They wish surgery and now fully agree to it.        DESCRIPTION OF PROCEDURE:  The patient was taken to the OR, placed under general endotracheal anesthetic, appropriately positioned, prepped and draped.  At this point, we appreciate tonsils being 3+ with tonsil stones.  We injected the anterior and posterior tonsillar pillars with 0.25% plain Marcaine.  The left tonsil was engaged in the superior pole, retracted medially.  Using Arthrocare Coblator tip at a setting of 6 with continuous irrigation, an incision was made in the anterior pillar.  We defined the capsule, staying above it.  We carefully dissected the tonsil while controlling hemostasis with a Coblator tip, provided bipolar cautery.  On the right side, we did the same.  The tonsil was engaged, retracted medially.  We made an incision in the anterior pillar, defined the capsule, staying above it.  Dissected the tonsil while controlling hemostasis with a Coblator tip, provided bipolar cautery.  The tonsil was removed without difficulty.  Hemostasis was well controlled.  The red Hollis catheter was used to retract the soft palate.  We examined the adenoids with a laryngeal mirror, saw that essentially there is 2+ adenoid tissue with  Inflammation filling the  nasopharynx .   We use COblator at settings of 8/4 to take adenoids down in layers and then control hemostasis with bipolar cautery in the coblator.  Patient was extubated in the OR, taken to recovery in stable condition with less than 5 mL blood loss.         HUMBLE VERMA MD

## 2024-04-23 NOTE — ANESTHESIA PROCEDURE NOTES
Airway       Patient location during procedure: OR       Procedure Start/Stop Times: 4/23/2024 2:27 PM  Staff -        CRNA: David Mcghee APRN CRNA       Performed By: CRNA  Consent for Airway        Urgency: elective  Indications and Patient Condition       Indications for airway management: tony-procedural       Induction type:intravenous       Mask difficulty assessment: 1 - vent by mask    Final Airway Details       Final airway type: endotracheal airway       Successful airway: Oral and ADRIENNE  Endotracheal Airway Details        ETT size (mm): 7.0       Cuffed: yes       Successful intubation technique: video laryngoscopy       VL Blade Size: Blair 3       Grade View of Cords: 1       Adjucts: stylet       Position: Center       Measured from: lips       Secured at (cm): 21       Bite block used: Oral Airway    Post intubation assessment        Placement verified by: capnometry, equal breath sounds and chest rise        Number of attempts at approach: 1       Number of other approaches attempted: 0       Secured with: plastic tape       Ease of procedure: easy       Dentition: Intact and Unchanged    Medication(s) Administered   Medication Administration Time: 4/23/2024 2:27 PM

## 2024-04-25 DIAGNOSIS — G89.18 POSTOPERATIVE PAIN: ICD-10-CM

## 2024-04-25 LAB
PATH REPORT.COMMENTS IMP SPEC: NORMAL
PATH REPORT.COMMENTS IMP SPEC: NORMAL
PATH REPORT.FINAL DX SPEC: NORMAL
PATH REPORT.GROSS SPEC: NORMAL
PATH REPORT.MICROSCOPIC SPEC OTHER STN: NORMAL
PATH REPORT.RELEVANT HX SPEC: NORMAL
PHOTO IMAGE: NORMAL

## 2024-04-25 NOTE — TELEPHONE ENCOUNTER
Medication Question or Refill    Contacts         Type Contact Phone/Fax    04/25/2024 04:35 PM CDT Phone (Incoming) Nella Florentino (Mother) [phone] (M)            What medication are you calling about (include dose and sig)?: HYDROcodone-acetaminophen 7.5-325 MG/15ML solution     Preferred Pharmacy:   [id number] - [facility] [address] AT [facility] OF [facility] [address]  [address]  [facility] [address]  Phone: [phone] Fax: [phone]      Controlled Substance Agreement on file:   CSA -- Patient Level:    CSA: None found at the patient level.       Who prescribed the medication?: lucille    Do you need a refill? Yes    Do you have any questions or concerns?  Yes: [facility] pharmacy will not have the med until Friday, they would like to medication transferred to [facility] in [place]      Could we send this information to you in [facility] or would you prefer to receive a phone call?:   Patient would prefer a phone call   Okay to leave a detailed message?: Yes at Home number on file [phone] Richard Carmen

## 2024-04-26 RX ORDER — HYDROCODONE BITARTRATE AND ACETAMINOPHEN 7.5; 325 MG/15ML; MG/15ML
7.5 SOLUTION ORAL 3 TIMES DAILY PRN
Qty: 100 ML | Refills: 0 | Status: SHIPPED | OUTPATIENT
Start: 2024-04-26

## 2024-05-08 ENCOUNTER — ALLIED HEALTH/NURSE VISIT (OUTPATIENT)
Dept: OTOLARYNGOLOGY | Facility: OTHER | Age: 24
End: 2024-05-08
Payer: COMMERCIAL

## 2024-05-08 VITALS — TEMPERATURE: 98.2 F

## 2024-05-08 DIAGNOSIS — Z90.89 S/P TONSILLECTOMY AND ADENOIDECTOMY: Primary | ICD-10-CM

## 2024-05-08 PROCEDURE — 99024 POSTOP FOLLOW-UP VISIT: CPT

## 2024-05-08 RX ORDER — FLUTICASONE PROPIONATE 50 MCG
1 SPRAY, SUSPENSION (ML) NASAL DAILY
Qty: 16 G | Refills: 0 | Status: SHIPPED | OUTPATIENT
Start: 2024-05-08 | End: 2024-05-22

## 2024-05-08 RX ORDER — ECHINACEA PURPUREA EXTRACT 125 MG
TABLET ORAL
Qty: 60 ML | Refills: 1 | Status: SHIPPED | OUTPATIENT
Start: 2024-05-08 | End: 2024-05-22

## 2024-05-08 ASSESSMENT — PAIN SCALES - GENERAL: PAINLEVEL: MILD PAIN (2)

## 2024-05-08 NOTE — PATIENT INSTRUCTIONS
Instructions for Patient    Prescriptions for Augmentin. Take for the full 10 days.  Use Saline nasal spray twice per day. 2 week course.  Use Flonase nasal spray each morning AFTER the saline spray. 2 week course.  Call our office if this is not completely cleared up after this treatment.    Pain  Can utilize over the counter tylenol/ibuprofen and ice    Diet  No restrictions    Activity  No restrictions    Medications   No restrictions    Contact clinic or Emergency Room if you develop:   Fever, difficulty swallowing, no improvement since surgery, trouble with speaking, withdrawn from normal daily activities, recurrent bleeding, significant numbness of the tongue.     Go to the Emergency Room   Shortness of breath,  chest pain, difficulty breathing    Follow-up visits   No further follow up unless concerns arise    [facility]  [facility]  [address] [place] [address] [place] [address]  Telephone:  [phone]  Fax:  [phone]

## 2024-05-08 NOTE — PROGRESS NOTES
"ENT Post-op Nurse Visit for Tonsillectomy and Adenoidectomy:  (2 weeks post-op)    Patient seen today per the order of  Dr. Dawson.   DOS: 4/23/24  Procedure: tonsillectomy and adenoidectomy, bilateral    Pain Assessment  2/10 to throat described as sore with swallowing.   Afebrile. Reports bilateral ear pain (lasted 3 days) was worse than the tonsils.    Assessment  Ears: Normal ear exam.    Oral and throat mucosa pink & moist without scabbing, ulceration, white coloration, or bleeding.     Nose: Reports mouth breathing (chronic), post-nasal dripping/mucous (new), and neck stiffness (chronic). Denies coughing and teeth grinding.    Mouth/Throat: Assessed tonsillary fossae. No granulation tissue or open wound. Nasal mucosa pink & moist. The tongue is mobile and midline without numbness.     Neck: Palpated the neck. No swollen lymph nodes.    Swallowing improving. Patient reports feeling as if she gets food/mucous stuck in her throat. Needs to \"hawk it up\". Mucus is yellow and dark green. Mucus also reported to come out of nose.  Tolerating a regular diet.   Patient's appetite is normal  Speech clear to understand.  Engaged in normal daily activities.    Follow Up   No follow up needed (unless concerns arise)    Forms/Time Off  None    Reviewed concerns above with Dr. Dawson who ordered Prescriptions for Augmentin. Take for the full 10 days.  Saline nasal spray twice per day. 2 week course.  Flonase nasal spray each morning AFTER the saline spray. 2 week course.  Patient to call our office if globus sensation and mucous is not completely cleared up after this treatment.    All of patient's questions addressed today. Patient was instructed to call with any additional questions/concerns.     Sabine Zhong RN on 5/8/2024 at 11:35 AM    "

## 2024-10-18 ENCOUNTER — TELEPHONE (OUTPATIENT)
Dept: FAMILY MEDICINE | Facility: CLINIC | Age: 24
End: 2024-10-18
Payer: COMMERCIAL

## 2024-10-18 NOTE — TELEPHONE ENCOUNTER
Patient calling in regarding pregnancy test she just had done at [facility]. She did a lab only appointment for a blood draw pregnancy test, did not see a provider. She is 8 days late. She is wanting to know if it means positive or not. RN attempted to load and update chart.  Lab results are not showing up from today.  Patient attempted to send in a iRx Reminder message with the image  uploaded but it is failing. Patient states she will run the results paper over to the [facility].  Will update staff to expect her to drop it off.

## 2024-10-21 NOTE — TELEPHONE ENCOUNTER
No document scanned into chart. Telephone encounter from [facility] about same situation.     Raghu Ward RN on 10/21/2024 at 10:08 AM

## 2024-12-05 SDOH — HEALTH STABILITY: PHYSICAL HEALTH: ON AVERAGE, HOW MANY MINUTES DO YOU ENGAGE IN EXERCISE AT THIS LEVEL?: 30 MIN

## 2024-12-05 SDOH — HEALTH STABILITY: PHYSICAL HEALTH: ON AVERAGE, HOW MANY DAYS PER WEEK DO YOU ENGAGE IN MODERATE TO STRENUOUS EXERCISE (LIKE A BRISK WALK)?: 1 DAY

## 2024-12-05 ASSESSMENT — SOCIAL DETERMINANTS OF HEALTH (SDOH): HOW OFTEN DO YOU GET TOGETHER WITH FRIENDS OR RELATIVES?: ONCE A WEEK

## 2024-12-10 ENCOUNTER — OFFICE VISIT (OUTPATIENT)
Dept: FAMILY MEDICINE | Facility: CLINIC | Age: 24
End: 2024-12-10
Payer: COMMERCIAL

## 2024-12-10 VITALS
TEMPERATURE: 97.8 F | HEIGHT: 65 IN | SYSTOLIC BLOOD PRESSURE: 125 MMHG | HEART RATE: 90 BPM | OXYGEN SATURATION: 98 % | BODY MASS INDEX: 27.02 KG/M2 | WEIGHT: 162.2 LBS | DIASTOLIC BLOOD PRESSURE: 76 MMHG | RESPIRATION RATE: 16 BRPM

## 2024-12-10 DIAGNOSIS — F33.41 RECURRENT MAJOR DEPRESSIVE DISORDER, IN PARTIAL REMISSION (H): ICD-10-CM

## 2024-12-10 DIAGNOSIS — N93.9 ABNORMAL UTERINE BLEEDING: Primary | ICD-10-CM

## 2024-12-10 PROCEDURE — 99213 OFFICE O/P EST LOW 20 MIN: CPT | Performed by: FAMILY MEDICINE

## 2024-12-10 ASSESSMENT — PAIN SCALES - GENERAL: PAINLEVEL_OUTOF10: NO PAIN (0)

## 2024-12-10 NOTE — PROGRESS NOTES
Preventive Care Visit  [facility]  Mari Bates MD, Family Medicine  Dec 10, 2024  {Provider  Link to SmartSet :002902}    {PROVIDER CHARTING PREFERENCE:858385}    Subjective   April is a 24 year old, presenting for the following:  Physical        12/10/2024     1:03 PM   Additional Questions   Roomed by Nba ESPINOZA    {MA/LPN/RN Pre-Provider Visit Orders- hCG/UA/Strep (Optional):982363}  {SUPERLIST (Optional):435372}  {additonal problems for provider to add (Optional):676053}  Health Care Directive  Patient does not have a Health Care Directive: Discussed advance care planning with patient; however, patient declined at this time.      12/5/2024   General Health   How would you rate your overall physical health? Good   Feel stress (tense, anxious, or unable to sleep) Only a little      (!) STRESS CONCERN      12/5/2024   Nutrition   Three or more servings of calcium each day? (!) NO   Diet: Regular (no restrictions)   How many servings of fruit and vegetables per day? (!) 2-3   How many sweetened beverages each day? 0-1            12/5/2024   Exercise   Days per week of moderate/strenous exercise 1 day   Average minutes spent exercising at this level 30 min      (!) EXERCISE CONCERN      12/5/2024   Social Factors   Frequency of gathering with friends or relatives Once a week   Worry food won't last until get money to buy more No   Food not last or not have enough money for food? No   Do you have housing? (Housing is defined as stable permanent housing and does not include staying ouside in a car, in a tent, in an abandoned building, in an overnight shelter, or couch-surfing.) Yes   Are you worried about losing your housing? No   Lack of transportation? No   Unable to get utilities (heat,electricity)? No            12/5/2024   Dental   Dentist two times every year? (!) DECLINE            12/5/2024   TB Screening   Were you born outside of the [place]? No          {Rooming  "Staff Patient needs a PHQ as part of the AWV.  Use this link to complete and then refresh the note to pull results Link to PHQ2 Assessment :333488}  {USE TO PULL IN PHQ RESULTS FOR TODAY:423270}      12/5/2024   Substance Use   Alcohol more than 3/day or more than 7/wk No   Do you use any other substances recreationally? No        Social History     Tobacco Use    Smoking status: Never    Smokeless tobacco: Never    Tobacco comments:     Vaping intermittently during pregnancy   Vaping Use    Vaping status: Every Day    Substances: Nicotine, Flavoring    Devices: Disposable, Pre-filled pod   Substance Use Topics    Alcohol use: Not Currently    Drug use: Never     {Provider  If there are gaps in the social history shown above, please follow the link to update and then refresh the note Link to Social and Substance History :472761}     {Mammogram Decision Support (Optional):015205}        12/5/2024   STI Screening   New sexual partner(s) since last STI/HIV test? No        History of abnormal Pap smear: { :771969}        2/26/2024     1:51 PM   PAP / HPV   PAP Negative for Intraepithelial Lesion or Malignancy (NILM)            12/5/2024   Contraception/Family Planning   Questions about contraception or family planning No        {Provider  REQUIRED FOR AWV Use the storyboard to review patient history, after sections have been marked as reviewed, refresh note to capture documentation:740363}   Reviewed and updated as needed this visit by Provider                    {HISTORY OPTIONS (Optional):294279}    {ROS Picklists (Optional):374255}     Objective    Exam  /76   Pulse 90   Temp 97.8  F (36.6  C) (Temporal)   Resp 16   Ht 1.643 m (5' 4.69\")   Wt 73.6 kg (162 lb 3.2 oz)   LMP 11/24/2024 (Exact Date)   SpO2 98%   Breastfeeding No   BMI 27.26 kg/m     Estimated body mass index is 27.26 kg/m  as calculated from the following:    Height as of this encounter: 1.643 m (5' 4.69\").    Weight as of this " encounter: 73.6 kg (162 lb 3.2 oz).    Physical Exam  {Exam Choices (Optional):810068}        Signed Electronically by: Mari Bates MD  {Email feedback regarding this note to [email or web address]   :341883}

## 2024-12-10 NOTE — PROGRESS NOTES
"  Assessment & Plan     (N93.9) Abnormal uterine bleeding  (primary encounter diagnosis)  Comment: Patient with what sounds like a missed/late.  Last month.  Was initially set to be an OB appointment but then had subsequent negative pregnancy testing.  She had an hCGs that were at 5 and apparently never increased from there.  She reports that she prior to this was pretty regular on her periods but that when she would be stressed or have other triggers sometimes she would miss 1.  She reports that with their initial pregnancy they conceived rather quickly once they started.  They have been trying for approximately 5 months this time.  We did discuss that normal infertility workup would not necessarily be indicated until 12 months of active trying with ovulation tracking.  Patient was amenable to deferral of continued workup including ultrasonography for another few months until they continue to try.  She was offered but declined lab workup as well as imaging.    (F33.41) Recurrent major depressive disorder, in partial remission (H)  Comment: Stable overall.  Was on problem list from prior.  Denies any SI/HI/AVH.            BMI  Estimated body mass index is 27.26 kg/m  as calculated from the following:    Height as of this encounter: 1.643 m (5' 4.69\").    Weight as of this encounter: 73.6 kg (162 lb 3.2 oz).   Weight management plan: Discussed healthy diet and exercise guidelines    Counseling  Appropriate preventive services were addressed with this patient via screening, questionnaire, or discussion as appropriate for fall prevention, nutrition, physical activity, Tobacco-use cessation, social engagement, weight loss and cognition.  Checklist reviewing preventive services available has been given to the patient.  Reviewed patient's diet, addressing concerns and/or questions.   She is at risk for lack of exercise and has been provided with information to increase physical activity for the benefit of her well-being. " "      Follow up AWV when due     Subjective   April is a 24 year old, presenting for the following health issues:  Physical and Pregnancy Complications        12/10/2024     1:10 PM   Additional Questions   Roomed by Nba     History of Present Illness       Reason for visit:  Est care, period concerns   She is taking medications regularly.             Objective    /76   Pulse 90   Temp 97.8  F (36.6  C) (Temporal)   Resp 16   Ht 1.643 m (5' 4.69\")   Wt 73.6 kg (162 lb 3.2 oz)   LMP 11/24/2024 (Exact Date)   SpO2 98%   Breastfeeding No   BMI 27.26 kg/m    Body mass index is 27.26 kg/m .  Physical Exam  Constitutional:       Appearance: Normal appearance.   HENT:      Head: Normocephalic.   Cardiovascular:      Pulses: Normal pulses.   Pulmonary:      Effort: Pulmonary effort is normal. No respiratory distress.   Skin:     General: Skin is warm and dry.      Capillary Refill: Capillary refill takes less than 2 seconds.   Neurological:      Mental Status: She is alert. Mental status is at baseline.   Psychiatric:         Mood and Affect: Mood normal.         Behavior: Behavior normal.         Thought Content: Thought content normal.         Judgment: Judgment normal.              No results found for any visits on 12/10/24.          Signed Electronically by: Mari Bates MD    "

## 2025-03-18 ENCOUNTER — OFFICE VISIT (OUTPATIENT)
Dept: FAMILY MEDICINE | Facility: CLINIC | Age: 25
End: 2025-03-18
Payer: COMMERCIAL

## 2025-03-18 DIAGNOSIS — Z00.00 ROUTINE GENERAL MEDICAL EXAMINATION AT A HEALTH CARE FACILITY: Primary | ICD-10-CM

## 2025-03-18 DIAGNOSIS — F33.41 RECURRENT MAJOR DEPRESSIVE DISORDER, IN PARTIAL REMISSION: ICD-10-CM

## 2025-03-18 PROCEDURE — 99395 PREV VISIT EST AGE 18-39: CPT | Performed by: FAMILY MEDICINE

## 2025-03-18 SDOH — HEALTH STABILITY: PHYSICAL HEALTH: ON AVERAGE, HOW MANY MINUTES DO YOU ENGAGE IN EXERCISE AT THIS LEVEL?: 60 MIN

## 2025-03-18 SDOH — HEALTH STABILITY: PHYSICAL HEALTH: ON AVERAGE, HOW MANY DAYS PER WEEK DO YOU ENGAGE IN MODERATE TO STRENUOUS EXERCISE (LIKE A BRISK WALK)?: 3 DAYS

## 2025-03-18 ASSESSMENT — PATIENT HEALTH QUESTIONNAIRE - PHQ9
10. IF YOU CHECKED OFF ANY PROBLEMS, HOW DIFFICULT HAVE THESE PROBLEMS MADE IT FOR YOU TO DO YOUR WORK, TAKE CARE OF THINGS AT HOME, OR GET ALONG WITH OTHER PEOPLE: NOT DIFFICULT AT ALL
SUM OF ALL RESPONSES TO PHQ QUESTIONS 1-9: 0
SUM OF ALL RESPONSES TO PHQ QUESTIONS 1-9: 0

## 2025-03-18 ASSESSMENT — SOCIAL DETERMINANTS OF HEALTH (SDOH): HOW OFTEN DO YOU GET TOGETHER WITH FRIENDS OR RELATIVES?: ONCE A WEEK

## 2025-03-18 NOTE — PROGRESS NOTES
Preventive Care Visit  [facility]  Mari Bates MD, Family Medicine  Mar 18, 2025      Assessment & Plan     (Z00.00) Routine general medical examination at a health care facility  (primary encounter diagnosis)  Comment: Patient is a 25-year-old female with history of major depressive disorder currently stable off of medications.  Here for her general well visit.  No acute issues or concerns today.  She was worried about some fatigue that was present but she is also a new mom currently has a 20-month-old.  She has been working in life [employer] she reports and feels like she is just overdoing it from an exertional standpoint.  She was offered but declined lab workup including thyroid, anemia check, electrolyte check.    (F33.41) Recurrent major depressive disorder, in partial remission  Comment: Denies any current symptoms but does report occasionally feeling overwhelmed or fatigue.  Denies any SI/HI/AVH.  Currently off meds and does not desire at this time      Note: Patient declined COVID and flu vaccination.  Declined pneumococcal vaccine but with the exception of vaping no other risk factors      Nicotine/Tobacco Cessation  She reports that she has been smoking other and cigarettes. She started smoking about 10 years ago. She has never used smokeless tobacco.  Nicotine/Tobacco Cessation Plan  Information offered: Patient not interested at this time      Counseling  Appropriate preventive services were addressed with this patient via screening, questionnaire, or discussion as appropriate for fall prevention, nutrition, physical activity, Tobacco-use cessation, social engagement, weight loss and cognition.  Checklist reviewing preventive services available has been given to the patient.  Reviewed patient's diet, addressing concerns and/or questions.   She is at risk for lack of exercise and has been provided with information to increase physical activity for the benefit of her  well-being.       Offered smoking cessation.  Currently vaping.  Did not declined this for now.  Will follow-up if she desires further support    Subjective   April is a 25 year old, presenting for the following:  Physical        3/18/2025     2:24 PM   Additional Questions   Roomed by Annette ESPINOZA    25-year-old female in her normal state of health.  She currently is a 20-month-old son and is status post  for that in .  She reports some fatigue but associates this with overexertion.  She was offered but declined lab workup at this time.  She also vapes regularly and we did discuss smoking cessation.  Patient was offered but declined Chantix, Wellbutrin, nicotine supplementation.    Advance Care Planning  Patient does not have a Health Care Directive: Discussed advance care planning with patient; however, patient declined at this time.      3/18/2025   General Health   How would you rate your overall physical health? Good   Feel stress (tense, anxious, or unable to sleep) Not at all         3/18/2025   Nutrition   Three or more servings of calcium each day? Yes   Diet: Regular (no restrictions)   How many servings of fruit and vegetables per day? (!) 0-1   How many sweetened beverages each day? 0-1         3/18/2025   Exercise   Days per week of moderate/strenous exercise 3 days   Average minutes spent exercising at this level 60 min         3/18/2025   Social Factors   Frequency of gathering with friends or relatives Once a week   Worry food won't last until get money to buy more No   Food not last or not have enough money for food? No   Do you have housing? (Housing is defined as stable permanent housing and does not include staying ouside in a car, in a tent, in an abandoned building, in an overnight shelter, or couch-surfing.) Yes   Are you worried about losing your housing? No   Lack of transportation? No   Unable to get utilities (heat,electricity)? No         3/18/2025   Dental   Dentist  two times every year? Yes           2024   TB Screening   Were you born outside of the [place]? No         Today's PHQ-9 Score:       3/18/2025    10:01 AM   PHQ-9 SCORE   PHQ-9 Total Score MyChart 0   PHQ-9 Total Score 0        Patient-reported         3/18/2025   Substance Use   Alcohol more than 3/day or more than 7/wk No   Do you use any other substances recreationally? No     Social History     Tobacco Use    Smoking status: Never    Smokeless tobacco: Never    Tobacco comments:     Vaping intermittently during pregnancy   Vaping Use    Vaping status: Every Day    Substances: Nicotine, Flavoring    Devices: Disposable, Pre-filled pod   Substance Use Topics    Alcohol use: Not Currently    Drug use: Never          Mammogram Screening - Patient under 40 years of age: Routine Mammogram Screening not recommended.         3/18/2025   STI Screening   New sexual partner(s) since last STI/HIV test? No     History of abnormal Pap smear: No - age 21-29 PAP every 3 years recommended        2024     1:51 PM   PAP / HPV   PAP Negative for Intraepithelial Lesion or Malignancy (NILM)            3/18/2025   Contraception/Family Planning   Questions about contraception or family planning No        Reviewed and updated as needed this visit by Provider                    Past Medical History:   Diagnosis Date    Anxiety      Past Surgical History:   Procedure Laterality Date     SECTION N/A 2023    Procedure:  SECTION;  Surgeon: Isaiah Castañeda MD;  Location: [facility] L[facility]    TONSILLECTOMY, ADENOIDECTOMY ADULT, COMBINED Bilateral 2024    Procedure: Bilateral Tonsillectomy and Adenoidectomy, ADULT;  Surgeon: Lui sArmando Dawson MD;  Location: [facility] OR    wisdom teeth removal       OB History    Para Term  AB Living   2 1 1 0 0 1   SAB IAB Ectopic Multiple Live Births   0 0 0 0 1      # Outcome Date GA Lbr Morgan/2nd Weight Sex Type Anes PTL Lv   2             1 Term 23 42w1d  3.655 kg  "(8 lb 0.9 oz) M CS-LTranv EPI, Spinal N DONTE      Complications: Failure to Progress in First Stage      Name: JOHNMALE-APRIL      Apgar1: 7  Apgar5: 9       Comprehensive review of systems negative/normal     Objective    Exam  LMP 03/11/2024    Estimated body mass index is 27.26 kg/m  as calculated from the following:    Height as of 12/10/24: 1.643 m (5' 4.69\").    Weight as of 12/10/24: 73.6 kg (162 lb 3.2 oz).    Physical Exam  Constitutional:       General: She is not in acute distress.     Appearance: She is normal weight. She is not ill-appearing, toxic-appearing or diaphoretic.   HENT:      Head: Normocephalic.      Right Ear: Tympanic membrane normal.      Left Ear: Tympanic membrane normal.      Mouth/Throat:      Mouth: Mucous membranes are moist.   Eyes:      General: No scleral icterus.     Pupils: Pupils are equal, round, and reactive to light.   Cardiovascular:      Rate and Rhythm: Normal rate and regular rhythm.      Pulses: Normal pulses.   Pulmonary:      Effort: Pulmonary effort is normal. No respiratory distress.      Breath sounds: No stridor. No wheezing, rhonchi or rales.   Abdominal:      General: Abdomen is flat.   Skin:     General: Skin is warm and dry.      Capillary Refill: Capillary refill takes less than 2 seconds.   Neurological:      Mental Status: She is alert. Mental status is at baseline.   Psychiatric:         Behavior: Behavior normal.         Thought Content: Thought content normal.         Judgment: Judgment normal.      Comments: Mild anhedonia/flat affect.               Signed Electronically by: Mari Bates MD    Answers submitted by the patient for this visit:  Patient Health Questionnaire (Submitted on 3/18/2025)  If you checked off any problems, how difficult have these problems made it for you to do your work, take care of things at home, or get along with other people?: Not difficult at all  PHQ9 TOTAL SCORE: 0    "

## 2025-05-12 ENCOUNTER — TELEPHONE (OUTPATIENT)
Dept: OBGYN | Facility: CLINIC | Age: 25
End: 2025-05-12
Payer: COMMERCIAL

## 2025-05-12 DIAGNOSIS — M62.89 PELVIC FLOOR DYSFUNCTION IN FEMALE: Primary | ICD-10-CM

## 2025-05-12 NOTE — TELEPHONE ENCOUNTER
"RN called pt, pt states she had a  and had issues with her pelvis from this.    Pt asking for PT referral due to pelvic floor \"feeling weak\". RN relayed we have not seen her so it could be discussed at her upcoming appt on . Pt asking if note could be sent to her PCP for this referral instead.    RN sending message to PCP regarding PT referral specifically to    Zaria Garcia at [facility] in [facility] (states this is a close family friend).    Aisha Andrade RN on 2025 at 2:52 PM    "

## 2025-05-12 NOTE — TELEPHONE ENCOUNTER
[facility] Call Center    Phone Message    May a detailed message be left on voicemail: yes     Reason for Call: Other: Pt is scheduled for first prenatal care appts at .  Pt is wondering if a referral to a pelvic floor therapist could be ordered.   Please give pt a call to discuss.       Action Taken: Message routed to:  Other:  obgyn    Travel Screening: Not Applicable     Date of Service:

## 2025-05-15 ENCOUNTER — MYC MEDICAL ADVICE (OUTPATIENT)
Dept: OBGYN | Facility: CLINIC | Age: 25
End: 2025-05-15
Payer: COMMERCIAL

## 2025-05-19 ENCOUNTER — MYC MEDICAL ADVICE (OUTPATIENT)
Dept: FAMILY MEDICINE | Facility: CLINIC | Age: 25
End: 2025-05-19
Payer: COMMERCIAL

## 2025-05-19 DIAGNOSIS — M62.89 PELVIC FLOOR DYSFUNCTION: Primary | ICD-10-CM

## 2025-06-03 ENCOUNTER — VIRTUAL VISIT (OUTPATIENT)
Dept: OBGYN | Facility: CLINIC | Age: 25
End: 2025-06-03
Payer: COMMERCIAL

## 2025-06-03 DIAGNOSIS — O34.219 PREGNANCY WITH HISTORY OF CESAREAN SECTION, ANTEPARTUM: Primary | ICD-10-CM

## 2025-06-03 PROBLEM — Z86.16 HISTORY OF 2019 NOVEL CORONAVIRUS DISEASE (COVID-19): Status: ACTIVE | Noted: 2021-04-27

## 2025-06-03 PROBLEM — F41.1 GENERALIZED ANXIETY DISORDER: Status: ACTIVE | Noted: 2021-02-08

## 2025-06-03 PROBLEM — N92.6 IRREGULAR MENSTRUAL CYCLE: Status: ACTIVE | Noted: 2021-02-08

## 2025-06-03 PROBLEM — G44.229 CHRONIC TENSION-TYPE HEADACHE, NOT INTRACTABLE: Status: ACTIVE | Noted: 2021-02-08

## 2025-06-03 PROBLEM — F33.41 RECURRENT MAJOR DEPRESSIVE DISORDER, IN PARTIAL REMISSION: Status: ACTIVE | Noted: 2021-02-08

## 2025-06-03 PROBLEM — R41.840 IMPAIRED ATTENTION: Status: ACTIVE | Noted: 2021-02-08

## 2025-06-03 LAB
ABO + RH BLD: NORMAL
BLD GP AB SCN SERPL QL: NEGATIVE
SPECIMEN EXP DATE BLD: NORMAL

## 2025-06-03 PROCEDURE — 99207 PR NO CHARGE NURSE ONLY: CPT | Mod: 93

## 2025-06-03 RX ORDER — VITAMIN A, VITAMIN C, VITAMIN D-3, VITAMIN E, VITAMIN B-1, VITAMIN B-2, NIACIN, VITAMIN B-6, CALCIUM, IRON, ZINC, COPPER 4000; 120; 400; 22; 1.84; 3; 20; 10; 1; 12; 200; 27; 25; 2 [IU]/1; MG/1; [IU]/1; MG/1; MG/1; MG/1; MG/1; MG/1; MG/1; UG/1; MG/1; MG/1; MG/1; MG/1
TABLET ORAL DAILY
COMMUNITY

## 2025-06-03 NOTE — PATIENT INSTRUCTIONS
Learning About Pregnancy  Your Care Instructions     Your health in the early weeks of your pregnancy is particularly important for your baby's health. Take good care of yourself. Anything you do that harms your body can also harm your baby.  Make sure to go to all of your doctor appointments. Regular checkups will help keep you and your baby healthy.  How can you care for yourself at home?  Diet    Choose healthy foods like fruits, vegetables, whole grains, lean proteins, and healthy fats.     Choose foods that are good sources of calcium, iron, and folate. You can try dairy products, dark leafy greens, fortified orange juice and cereals, almonds, broccoli, dried fruit, and beans.     Do not skip meals or go for many hours without eating. If you are nauseated, try to eat a small, healthy snack every 2 to 3 hours.     Avoid fish that are high in mercury. These include shark, swordfish, mickey mackerel, marlin, orange roughy, and bigeye tuna, as well as tilefish from the [facility].     It's okay to eat up to 8 to 12 ounces a week of fish that are low in mercury or up to 4 ounces a week of fish that have medium levels of mercury. Some fish that are low in mercury are salmon, shrimp, canned light tuna, cod, and tilapia. Some fish that have medium levels of mercury are halibut and white albacore tuna.     Drink plenty of fluids. If you have kidney, heart, or liver disease and have to limit fluids, talk with your doctor before you increase the amount of fluids you drink.     Limit caffeine to about 200 to 300 mg per day. On average, a cup of brewed coffee has around 80 to 100 mg of caffeine.     Do not drink alcohol, such as beer, wine, or hard liquor.     Take a multivitamin that contains at least 400 micrograms (mcg) of folic acid to help prevent birth defects. Fortified cereal and whole wheat bread are good additional sources of folic acid.     Increase the calcium in your diet. Try to drink a quart of skim milk  each day. You may also take calcium supplements and choose foods such as cheese and yogurt.   Lifestyle    Make sure you go to your follow-up appointments.     Get plenty of rest. You may be unusually tired while you are pregnant.     Get at least 30 minutes of exercise on most days of the week. Walking is a good choice. If you have not exercised in the past, start out slowly. Take several short walks each day.     Do not smoke. If you need help quitting, talk to your doctor about stop-smoking programs. These can increase your chances of quitting for good.     Do not touch cat feces or litter boxes. Also, wash your hands after you handle raw meat, and fully cook all meat before you eat it. Wear gloves when you work in the yard or garden, and wash your hands well when you are done. Cat feces, raw or undercooked meat, and contaminated dirt can cause an infection that may harm your baby or lead to a miscarriage.     Avoid things that can make your body too hot and may be harmful to your baby, such as a hot tub or sauna. Or talk with your doctor before doing anything that raises your body temperature. Your doctor can tell you if it's safe.     Avoid chemical fumes, paint fumes, or poisons.     Do not use illegal drugs, marijuana, or alcohol.   Medicines    Review all of your medicines with your doctor. Some of your routine medicines may need to be changed to protect your baby.     Use acetaminophen (Tylenol) to relieve minor problems, such as a mild headache or backache or a mild fever with cold symptoms. Do not use nonsteroidal anti-inflammatory drugs (NSAIDs), such as ibuprofen (Advil, Motrin) or naproxen (Aleve), unless your doctor says it is okay.     Do not take two or more pain medicines at the same time unless the doctor told you to. Many pain medicines have acetaminophen, which is Tylenol. Too much acetaminophen (Tylenol) can be harmful.     Take your medicines exactly as prescribed. Call your doctor if you  "think you are having a problem with your medicine.   To manage morning sickness    Keep food in your stomach, but not too much at once. Try eating five or six small meals a day instead of three large meals.     For nausea when you wake up, eat a small snack, such as a couple of crackers or pretzels, before rising. Allow a few minutes for your stomach to settle before you slowly get up.     Try to avoid smells and foods that make you feel nauseated. High-fat or greasy foods, milk, and coffee may make nausea worse. Some foods that may be easier to tolerate include cold, spicy, sour, and salty foods.     Drink enough fluids. Water and other caffeine-free drinks are good choices.     Take your prenatal vitamins at night on a full stomach.     Try foods and drinks made with aleshia. Aleshia may help with nausea.     Get lots of rest. Morning sickness may be worse when you are tired.     Talk to your doctor about over-the-counter products, such as vitamin B6 or doxylamine, to help relieve symptoms.     Try a P6 acupressure wrist band. These anti-nausea wristbands help some people.   Follow-up care is a key part of your treatment and safety. Be sure to make and go to all appointments, and call your doctor if you are having problems. It's also a good idea to know your test results and keep a list of the medicines you take.  Where can you learn more?  Go to https://www.TrillTip.net/patiented  Enter E868 in the search box to learn more about \"Learning About Pregnancy.\"  Current as of: April 30, 2024  Content Version: 14.4    0577-4127 [employer].   Care instructions adapted under license by your healthcare professional. If you have questions about a medical condition or this instruction, always ask your healthcare professional. [employer] disclaims any warranty or liability for your use of this information.    Weeks 6 to 10 of Your Pregnancy: Care Instructions  During these weeks of pregnancy, your " "body goes through many changes. You may start to feel different, both in your body and your emotions. Each pregnancy is different, so there's no \"right\" way to feel. These early weeks are a time to make healthy choices for you and your pregnancy.    Take a daily prenatal vitamin. Choose one with folic acid in it.   Avoid alcohol, tobacco, and drugs (including marijuana). If you need help quitting, talk to your doctor.     Drink plenty of liquids.  Be sure to drink enough water. And limit sodas, other sweetened drinks, and caffeine.     Choose foods that are good sources of calcium, iron, and folate.  You can try dairy products, dark leafy greens, fortified orange juice and cereals, almonds, broccoli, dried fruit, and beans.     Avoid foods that may be harmful.  Don't eat raw meat, deli meat, raw seafood, or raw eggs. Avoid soft cheese and unpasteurized dairy, like Brie and blue cheese. And don't eat fish that contains a lot of mercury, like shark and swordfish.     Don't touch zeb litter or cat poop.  They can cause an infection that could be harmful during pregnancy.     Avoid things that can make your body too hot.  For example, avoid hot tubs and saunas.     Soothe morning sickness.  Try eating 5 or 6 small meals a day, getting some fresh air, or using madelyn to control symptoms.     Ask your doctor about flu and COVID-19 shots.  Getting them can help protect against infection.   Follow-up care is a key part of your treatment and safety. Be sure to make and go to all appointments, and call your doctor if you are having problems. It's also a good idea to know your test results and keep a list of the medicines you take.  Where can you learn more?  Go to https://www.MOOI.net/patiented  Enter G112 in the search box to learn more about \"Weeks 6 to 10 of Your Pregnancy: Care Instructions.\"  Current as of: April 30, 2024  Content Version: 14.4    0082-7543 [facility] [employer].   Care instructions adapted " under license by your healthcare professional. If you have questions about a medical condition or this instruction, always ask your healthcare professional. [employer] disclaims any warranty or liability for your use of this information.       Pregnancy: Managing Morning Sickness (01:48)  Your health professional recommends that you watch this short online health video.  Learn how to manage morning sickness during pregnancy.   Purpose: Learn how to manage morning sickness during pregnancy.  Goal: Learn how to manage morning sickness during pregnancy.    Watch: Scan the QR code or visit the link to view video       [email or web address]  Current as of: April 30, 2024  Content Version: 14.4    9589-0604 [employer].   Care instructions adapted under license by your healthcare professional. If you have questions about a medical condition or this instruction, always ask your healthcare professional. [employer] disclaims any warranty or liability for your use of this information.    Pregnancy and Heartburn: Care Instructions  Overview     Heartburn is a common problem during pregnancy.  Heartburn happens when stomach acid backs up into the tube that carries food to the stomach. This tube is called the esophagus. Early in pregnancy, heartburn is caused by hormone changes that slow down digestion. Later on, it's also caused by the large uterus pushing up on the stomach.  Even though you can't fix the cause, there are things you can do to get relief. Treating heartburn during pregnancy focuses first on making lifestyle changes, like changing what and how you eat, and on taking medicines.  Heartburn usually improves or goes away after childbirth.  Follow-up care is a key part of your treatment and safety. Be sure to make and go to all appointments, and call your doctor if you are having problems. It's also a good idea to know your test results and keep a list of the medicines you  "take.  How can you care for yourself at home?  Eat small, frequent meals.  Avoid foods that make your symptoms worse, such as chocolate, peppermint, and spicy foods. Avoid drinks with caffeine, such as coffee, tea, and sodas.  Avoid bending over or lying down after meals.  Take a short walk after you eat.  If heartburn is a problem at night, do not eat for 2 hours before bedtime.  Take antacids like Mylanta, Maalox, Rolaids, or Tums. Do not take antacids that have sodium bicarbonate, magnesium trisilicate, or aspirin. Be careful when you take over-the-counter antacid medicines. Many of these medicines have aspirin in them. While you are pregnant, do not take aspirin or medicines that contain aspirin unless your doctor says it is okay.  If you're not getting relief, talk to your doctor. You may be able to take a stronger acid-reducing medicine.  When should you call for help?   Call your doctor now or seek immediate medical care if:    You have new or worse belly pain.     You are vomiting.   Watch closely for changes in your health, and be sure to contact your doctor if:    You have new or worse symptoms of reflux.     You are losing weight.     You have trouble or pain swallowing.     You do not get better as expected.   Where can you learn more?  Go to https://www.Mitokyne.net/patiented  Enter U946 in the search box to learn more about \"Pregnancy and Heartburn: Care Instructions.\"  Current as of: April 30, 2024  Content Version: 14.4    1023-3191 [employer].   Care instructions adapted under license by your healthcare professional. If you have questions about a medical condition or this instruction, always ask your healthcare professional. [employer] disclaims any warranty or liability for your use of this information.    Constipation: Care Instructions  Overview     Constipation means that you have a hard time passing stools (bowel movements). People pass stools from 3 times a day to " once every 3 days. What is normal for you may be different. Constipation may occur with pain in the rectum and cramping. The pain may get worse when you try to pass stools. Sometimes there are small amounts of bright red blood on toilet paper or the surface of stools. This is because of enlarged veins near the rectum (hemorrhoids).  A few changes in your diet and lifestyle may help you avoid ongoing constipation. Your doctor may also prescribe medicine to help loosen your stool.  Some medicines can cause constipation. These include pain medicines and antidepressants. Tell your doctor about all the medicines you take. Your doctor may want to make a medicine change to ease your symptoms.  Follow-up care is a key part of your treatment and safety. Be sure to make and go to all appointments, and call your doctor if you are having problems. It's also a good idea to know your test results and keep a list of the medicines you take.  How can you care for yourself at home?  Drink plenty of fluids. If you have kidney, heart, or liver disease and have to limit fluids, talk with your doctor before you increase the amount of fluids you drink.  Include high-fiber foods in your diet each day. These include fruits, vegetables, beans, and whole grains.  Get at least 30 minutes of exercise on most days of the week. Walking is a good choice. You also may want to do other activities, such as running, swimming, cycling, or playing tennis or team sports.  Take a fiber supplement, such as Citrucel or Metamucil, every day. Read and follow all instructions on the label.  Schedule time each day for a bowel movement. A daily routine may help. Take your time having a bowel movement, but don't sit for more than 10 minutes at a time. And don't strain too much.  Support your feet with a small step stool when you sit on the toilet. This helps flex your hips and places your pelvis in a squatting position.  Your doctor may recommend an  "over-the-counter laxative to relieve your constipation. Examples are Milk of Magnesia and MiraLax. Read and follow all instructions on the label. Do not use laxatives on a long-term basis.  When should you call for help?   Call your doctor now or seek immediate medical care if:    You have new or worse belly pain.     You have new or worse nausea or vomiting.     You have blood in your stools.   Watch closely for changes in your health, and be sure to contact your doctor if:    Your constipation is getting worse.     You do not get better as expected.   Where can you learn more?  Go to https://www.Billibox.net/patiented  Enter P343 in the search box to learn more about \"Constipation: Care Instructions.\"  Current as of: October 19, 2024  Content Version: 14.4    3053-4403 [employer].   Care instructions adapted under license by your healthcare professional. If you have questions about a medical condition or this instruction, always ask your healthcare professional. [employer] disclaims any warranty or liability for your use of this information.    Learning About High-Iron Foods  What foods are high in iron?     The foods you eat contain nutrients, such as vitamins and minerals. Iron is a nutrient. Your body needs the right amount to stay healthy and work as it should. You can use the list below to help you make choices about which foods to eat.  Here are some foods that contain iron. They have 1 to 2 milligrams of iron per serving.  Fruits  Figs (dried), 5 figs  Vegetables  Asparagus (canned), 6 hou  Delmar, beet, Swiss chard, or turnip greens, 1 cup  Dried peas, cooked,   cup  Seaweed, spirulina (dried),   cup  Spinach, (cooked)   cup or (raw) 1 cup  Grains  Cereals, fortified with iron, 1 cup  Grits (instant, cooked), fortified with iron,   cup  Meats and other protein foods  Beans (kidney, lima, navy, white), canned or cooked,   cup  Beef or lamb, 3 oz  Chicken giblets, 3 " "oz  Chickpeas (garbanzo beans),   cup  Liver of beef, lamb, or pork, 3 oz  Oysters (cooked), 3 oz  Sardines (canned), 3 oz  Soybeans (boiled),   cup  Tofu (firm),   cup  Work with your doctor to find out how much of this nutrient you need. Depending on your health, you may need more or less of it in your diet.  Where can you learn more?  Go to https://[email or web address].net/patiented  Enter R005 in the search box to learn more about \"Learning About High-Iron Foods.\"  Current as of: October 7, 2024  Content Version: 14.4    5024-9125 [employer].   Care instructions adapted under license by your healthcare professional. If you have questions about a medical condition or this instruction, always ask your healthcare professional. [employer] disclaims any warranty or liability for your use of this information.    Rh Antibodies Screening During Pregnancy: About This Test  What is it?     The Rh antibodies screening test is a blood test. It checks your blood for Rh antibodies. If you have Rh-negative blood and have been exposed to Rh-positive blood, your immune system may make antibodies to attack the Rh-positive blood. When a pregnant woman has these antibodies, it is called Rh sensitization.  Why is this test done?  The Rh antibodies screening test is done during pregnancy to find out if your baby is at risk for Rh disease. This can happen if you have Rh-negative blood and your baby has Rh-positive blood. If your Rh-negative blood mixes with Rh-positive blood, your immune system will make antibodies to attack the Rh-positive blood.  During pregnancy, these antibodies could attach to the baby's red blood cells. This can cause your baby to have serious health problems. The results of this test will help your doctor know how to best care for you and your baby during your pregnancy.  How do you prepare for the test?  In general, there's nothing you have to do before this test, unless your doctor tells you " "to.  How is the test done?  A health professional uses a needle to take a blood sample, usually from the arm.  What happens after the test?  You will probably be able to go home right away. It depends on the reason for the test.  You can go back to your usual activities right away.  Follow-up care is a key part of your treatment and safety. Be sure to make and go to all appointments, and call your doctor if you are having problems. It's also a good idea to keep a list of the medicines you take. Ask your doctor when you can expect to have your test results.  Where can you learn more?  Go to https://www.CreativeD.net/patiented  Enter P722 in the search box to learn more about \"Rh Antibodies Screening During Pregnancy: About This Test.\"  Current as of: 2024  Content Version: 14.4    8832-0622 [employer].   Care instructions adapted under license by your healthcare professional. If you have questions about a medical condition or this instruction, always ask your healthcare professional. [employer] disclaims any warranty or liability for your use of this information.    Learning About Preventing Rh Disease  What is Rh disease?     Rh disease can be a serious problem in pregnancy. It happens when substances called antibodies in the mother's blood cause red blood cells in her baby's blood to be destroyed. This can occur when the blood types of a mother and her baby do not match.  All blood has an Rh factor. This is what makes a blood type positive or negative. When you are Rh-negative, your baby may be Rh-negative or Rh-positive. If your baby has Rh-positive blood and it mixes with yours, your body will make antibodies. This is called Rh sensitization.  Most of the time, this is not a problem in a first pregnancy. But in future pregnancies, it could cause Rh disease.  A  with Rh disease has mild anemia and may have jaundice. In severe cases, anemia, jaundice, and swelling can be " "very dangerous or fatal. Some babies need to be delivered early. Some need special care in the NICU. A very sick baby will need a blood transfusion before or after birth.  Fortunately, Rh sensitization is usually easy to prevent.  That's why it's important to get your Rh status checked in your first trimester. It doesn't cause any warning signs. A blood test is the only way to know if you are Rh-sensitive or are at risk for it.  How can you prevent Rh disease?  If you are Rh-negative, your doctor gives you an Rh immune globulin shot (such as RhoGAM). It helps prevent your body from making the antibodies that attack your baby's red blood cells.  Timing is important. You need the shot at certain times during your pregnancy. And you need one anytime there is a chance that your baby's blood might mix with yours. That can happen with certain prenatal tests or when you have pregnancy bleeding, such as:  Right after any pregnancy loss, amniocentesis, or CVS testing.  After turning of a breech baby.  Before and maybe after childbirth. Your doctor gives you a shot around week 28. If your  is Rh-positive, you will have another shot.  Follow-up care is a key part of your treatment and safety. Be sure to make and go to all appointments, and call your doctor if you are having problems. It's also a good idea to know your test results and keep a list of the medicines you take.  Where can you learn more?  Go to https://www.eBureau.net/patiented  Enter W177 in the search box to learn more about \"Learning About Preventing Rh Disease.\"  Current as of: 2024  Content Version: 14.4    6769-4293 [employer].   Care instructions adapted under license by your healthcare professional. If you have questions about a medical condition or this instruction, always ask your healthcare professional. [employer] disclaims any warranty or liability for your use of this information.    Learning About Rh " Immunoglobulin Shots  Introduction     An Rh immunoglobulin shot is given to pregnant women who have Rh-negative blood.  You may have Rh-negative blood, and your baby may have Rh-positive blood. If the two types of blood mix, your body will make antibodies. This is called Rh sensitization. Most of the time, this is not a problem the first time you're pregnant. But it could cause problems in future pregnancies.  This shot keeps your body from making the antibodies. You get the shot around 28 weeks of pregnancy. After the birth, your baby's blood is tested. If the blood is Rh positive, you will get another shot. You may also get the shot if you have vaginal bleeding while you are pregnant or if you have a miscarriage. These shots protect future pregnancies.  Women with Rh negative blood will need this shot each time they get pregnant.  Example  Rh immunoglobulin (HypRho-D, MICRhoGAM, and RhoGAM)  Possible side effects  Rare side effects may include:  Some mild pain where you got the shot.  A slight fever.  An allergic reaction.  You may have other side effects not listed here. Check the information that comes with your medicine.  What to know about taking this medicine  You may need more than one shot. You may need the shot again:  After amniocentesis, fetal blood sampling, or chorionic villus sampling tests.  If you have bleeding in your second or third trimester.  After turning of a breech baby.  After an injury to the belly while you are pregnant.  After a miscarriage or an .  Before or right after treatment for an ectopic or a partial molar pregnancy.  Tell your doctor if you have any allergies or have had a bad response to medicines in the past.  If you get this shot within 3 months of getting a live-virus vaccine, the vaccine may not work. Your doctor will tell you if you need more vaccine.  Check with your doctor or pharmacist before you use any other medicines. This includes over-the-counter medicines.  "Make sure your doctor knows all of the medicines, vitamins, herbs, and supplements you take. Taking some medicines at the same time can cause problems.  Where can you learn more?  Go to https://www.Storelift.net/patiented  Enter V615 in the search box to learn more about \"Learning About Rh Immunoglobulin Shots.\"  Current as of: April 30, 2024  Content Version: 14.4    6572-8893 [employer].   Care instructions adapted under license by your healthcare professional. If you have questions about a medical condition or this instruction, always ask your healthcare professional. [employer] disclaims any warranty or liability for your use of this information.    Rubella ([place] Measles): Care Instructions  Overview  Rubella, also called [place] measles or 3-day measles, is a disease caused by a virus. It spreads by coughs, sneezes, and close contact. Rubella usually is mild and does not cause long-term problems. But if you are pregnant and get it, you can give the disease to your unborn baby. This can cause serious birth defects.  While you have rubella, you may get a rash and a mild fever, and the lymph glands in your neck may swell. Older children often have a fever, eye pain, a sore throat, and body aches. You can relieve most symptoms with care at home. Avoid being around others, especially pregnant people, until your rash has been gone for at least 4 days. People who have not had this disease before or have not had the vaccine have the greatest chance of getting the virus.  Follow-up care is a key part of your treatment and safety. Be sure to make and go to all appointments, and call your doctor if you are having problems. It's also a good idea to know your test results and keep a list of the medicines you take.  How can you care for yourself at home?  Drink plenty of fluids. If you have kidney, heart, or liver disease and have to limit fluids, talk with your doctor before you increase the amount " "of fluids you drink.  Get plenty of rest to help your body heal.  Take an over-the-counter pain medicine, such as acetaminophen (Tylenol), ibuprofen (Advil, Motrin), or naproxen (Aleve), to reduce fever and discomfort. Read and follow all instructions on the label. Do not give aspirin to anyone younger than 20. It has been linked to Reye syndrome, a serious illness.  Do not take two or more pain medicines at the same time unless the doctor told you to. Many pain medicines have acetaminophen, which is Tylenol. Too much acetaminophen (Tylenol) can be harmful.  Try not to scratch the rash. Put cold, wet cloths on the rash to reduce itching.  Do not smoke. Smoking can make your symptoms worse. If you need help quitting, talk to your doctor about stop-smoking programs and medicines. These can increase your chances of quitting for good.  Avoid contact with people who have never had rubella and who have not been immunized.  When should you call for help?   Call your doctor now or seek immediate medical care if:    You have a fever with a stiff neck or a severe headache.     You are sensitive to light or feel very sleepy or confused.   Watch closely for changes in your health, and be sure to contact your doctor if:    You do not get better as expected.   Where can you learn more?  Go to https://www.Txt4.net/patiented  Enter B812 in the search box to learn more about \"Rubella ([place] Measles): Care Instructions.\"  Current as of: April 30, 2024  Content Version: 14.4    1001-0378 [employer].   Care instructions adapted under license by your healthcare professional. If you have questions about a medical condition or this instruction, always ask your healthcare professional. [employer] disclaims any warranty or liability for your use of this information.    Gonorrhea and Chlamydia: About These Tests  What is it?  These tests use a sample of urine or other body fluid to look for the bacteria that " cause these sexually transmitted infections (STIs). The fluid sample can come from the cervix, vagina, rectum, throat, or eyes.  Why is this test done?  These tests may be done to:  Find out if symptoms are caused by gonorrhea or chlamydia.  Check people who are at high risk of being infected with gonorrhea or chlamydia.  Retest people several months after they have been treated for gonorrhea or chlamydia.  Check for infection in your  if you had a gonorrhea or chlamydia infection at the time of delivery.  How can you prepare for the test?  If you are going to have a urine test, do not urinate for at least 1 hour before the test.  If you think you may have chlamydia or gonorrhea, don't have sexual intercourse until you get your test results. And you may want to have tests for other STIs, such as HIV.  How is the test done?  For a direct sample, a swab is used to collect body fluid from the cervix, vagina, rectum, throat, or eyes. Your doctor may collect the sample. Or you may be given instructions on how to collect your own sample.  For a urine sample, you will collect the urine that comes out when you first start to urinate. Don't wipe the genital area clean before you urinate.  How long does the test take?  The test will take a few minutes.  What happens after the test?  You will be able to go home right away.  You can go back to your usual activities right away.  If you do have an infection, don't have sexual intercourse for 7 days after you start treatment. And your sex partner(s) should also be treated.  Follow-up care is a key part of your treatment and safety. Be sure to make and go to all appointments, and call your doctor if you are having problems. It's also a good idea to keep a list of the medicines you take. Ask your doctor when you can expect to have your test results.  Where can you learn more?  Go to https://www.healthwise.net/patiented  Enter K976 in the search box to learn more about  "\"Gonorrhea and Chlamydia: About These Tests.\"  Current as of: April 30, 2024  Content Version: 14.4    8469-7531 [employer] [facility].   Care instructions adapted under license by your healthcare professional. If you have questions about a medical condition or this instruction, always ask your healthcare professional. [employer] disclaims any warranty or liability for your use of this information.    Trichomoniasis: About This Test  What is it?     This test uses a sample of urine or other body fluid to look for the tiny parasite that causes trichomoniasis (also called trich). The fluid sample can come from the vagina, cervix, or urethra. Your doctor may choose to use one or more of many available tests.  Why is it done?  A trich test may be done to:  Find out if symptoms are caused by trich.  Check people who are at high risk for being infected with trich.  Check after treatment to make sure that the infection is gone.  How do you prepare for the test?  If you are going to have a urine test, do not urinate for at least 1 hour before the test.  How is the test done?  For a direct sample, a swab is used to collect body fluid from the cervix, vagina, or urethra. Your doctor may collect the sample. Or you may be given instructions on how to collect your own sample.  For a urine sample, you will collect the urine that comes out when you first start to urinate. Don't wipe the area clean before you urinate.  How long does the test take?  It will take a few minutes to collect a sample.  What happens after the test?  You can go home right away.  You can go back to your usual activities right away.  You may get the test results the same day or several days later. It depends on the test used.  If you do have an infection, don't have sexual intercourse for 7 days after you start treatment. Your sex partner or partners should also be treated.  Follow-up care is a key part of your treatment and safety. Be sure to " make and go to all appointments, and call your doctor if you are having problems. Ask your doctor when you can expect to have your test results.  Current as of: April 30, 2024  Content Version: 14.4    7771-2007 [employer].   Care instructions adapted under license by your healthcare professional. If you have questions about a medical condition or this instruction, always ask your healthcare professional. [employer] disclaims any warranty or liability for your use of this information.    HIV Testing: Care Instructions  Overview  You can get tested for the human immunodeficiency virus (HIV). Most doctors use a blood test to check for HIV antibodies and antigens in your blood. It may also check for the genetic material (RNA) of HIV. Some tests use saliva to check for HIV antibodies. But these aren't as accurate. For example, they may give a false result if you've just been infected.  What do the results mean?        Normal (negative)   No HIV antibodies, antigens, or RNA were found.  You may need more testing. It can make sure your test results are correct.        Uncertain (indeterminate)   Test results didn't clearly show if you have an HIV infection.  HIV antibodies or antigens may not have formed yet.  Some other type of antibody or antigen may have affected the results.  You will need another test to be sure.        Abnormal (positive)   HIV antibodies, antigens, or RNA were found.  If you haven't had an RNA test yet, one will be done. If it's positive, you have HIV.  If your test result is positive, your doctor will talk to you. You will discuss starting treatment.  Follow-up care is a key part of your treatment and safety. Be sure to make and go to all appointments, and call your doctor if you are having problems. It's also a good idea to know your test results and keep a list of the medicines you take.  Where can you learn more?  Go to https://www.RotaBan.net/patiented  Enter T792 in  "the search box to learn more about \"HIV Testing: Care Instructions.\"  Current as of: April 30, 2024  Content Version: 14.4    4766-0722 [employer].   Care instructions adapted under license by your healthcare professional. If you have questions about a medical condition or this instruction, always ask your healthcare professional. [employer] disclaims any warranty or liability for your use of this information.    Hepatitis C Virus Tests: About These Tests  What are they?     Hepatitis C virus tests are blood tests that check for substances in the blood that show whether you have hepatitis C now or had it in the past. The tests can also tell you what type of hepatitis C you have and how severe the disease is. This can help your doctor with treatment.  If the tests show that you have long-term hepatitis C, you need to take steps to prevent spreading the disease.  Why are these tests done?  You may need these tests if:  You have symptoms of hepatitis.  You may have been exposed to the virus. You are more likely to have been exposed to the virus if you inject drugs or are exposed to body fluids (such as if you are a health care worker).  You've had other tests that show you have liver problems.  You are 18 to 79 years old.  You have an HIV infection.  The tests also are done to help your doctor decide about your treatment and see how well it works.  How do you prepare for the test?  In general, there's nothing you have to do before this test, unless your doctor tells you to.  How is the test done?  A health professional uses a needle to take a blood sample, usually from the arm.  What happens after these tests?  You will probably be able to go home right away.  You can go back to your usual activities right away.  Follow-up care is a key part of your treatment and safety. Be sure to make and go to all appointments, and call your doctor if you are having problems. It's also a good idea to keep a " "list of the medicines you take. Ask your doctor when you can expect to have your test results.  Where can you learn more?  Go to https://www.Golden Gekko.net/patiented  Enter W551 in the search box to learn more about \"Hepatitis C Virus Tests: About These Tests.\"  Current as of: April 30, 2024  Content Version: 14.4    2916-0055 [employer].   Care instructions adapted under license by your healthcare professional. If you have questions about a medical condition or this instruction, always ask your healthcare professional. [employer] disclaims any warranty or liability for your use of this information.    Learning About Fetal Ultrasound Results  What is a fetal ultrasound?     Fetal ultrasound is a test that lets your doctor see an image of your baby. Your doctor learns information about your baby from this picture. You may find out, for example, if you are having a boy or a girl. But the main reason you have this test is to get information about your baby's health.  (You may hear your baby called a fetus. This is a common medical term for a baby that's growing in the mother's uterus.)  What kind of information can you learn from this test?  The findings of an ultrasound fall into two categories, normal and abnormal.  Normal  The fetus is the right size for its age.  The placenta is the expected size and does not cover the cervix.  There is enough amniotic fluid in the uterus.  No birth defects can be seen.  Abnormal  The fetus is small or large for its age.  The placenta covers the cervix.  There is too much or too little amniotic fluid in the uterus.  The fetus may have a birth defect.  What does an abnormal result mean?  Abnormal seems to imply that something is wrong with your baby. But what it means is that the test has shown something the doctor wants to take a closer look at.  And that's what happens next. Your doctor will talk to you about what further test or tests you may need.  What " do the results mean?  Some of the things your doctor may see on an abnormal ultrasound include:  Echogenic bowel.  The bowel looks very bright on the screen. This could mean that there's blood in the bowel. Or it could mean that something is blocking the small bowel.  Increased nuchal translucency.  The ultrasound measures the thickness at the back of the baby's neck. An increase in thickness is sometimes an early sign of Down syndrome.  Increased or decreased amniotic fluid.  The doctor will look for a reason for the level of amniotic fluid and will watch the pregnancy closely as it progresses.  Large ventricles.  Ventricles in the brain look larger than they should. Your doctor may take a closer look at the brain.  Renal pyelectasis/hydronephrosis.  The ultrasound measures the fluid around the kidney. If there is more fluid than expected, there is a chance of urinary tract or kidney problems.  Short long bones.  The ultrasound measures certain arm and leg bones. A long bone (humerus or femur) that is shorter than average could be a sign of Down syndrome.  Subchorionic hemorrhage.  An ultrasound can show bleeding under one of the membranes that surrounds the fetus. Some women don't have symptoms of bleeding. The ultrasound can find this problem when women are not bleeding from their vagina. Women who have this condition have a slightly higher chance of miscarriage.  What do you do now?  Take a deep breath, and let it out. Keep in mind that an abnormal finding on an ultrasound, after it's coupled with more information, may:  Turn out to be nothing.  Turn out to be something mild that won't affect the baby.  Turn out to be something more serious. But if this happens, early diagnosis helps you and your doctor plan treatment options sooner rather than later.  Your medical team is there for you. So are your family and friends. Ask questions, and get the help and support you need.  Follow-up care is a key part of your  "treatment and safety. Be sure to make and go to all appointments, and call your doctor if you are having problems. It's also a good idea to know your test results and keep a list of the medicines you take.  Where can you learn more?  Go to https://www.[employer].net/patiented  Enter K451 in the search box to learn more about \"Learning About Fetal Ultrasound Results.\"  Current as of: April 30, 2024  Content Version: 14.4    [id number] [employer].   Care instructions adapted under license by your healthcare professional. If you have questions about a medical condition or this instruction, always ask your healthcare professional. [employer] disclaims any warranty or liability for your use of this information.    Learning About Prenatal Visits  Overview     Regular prenatal visits are very important during any pregnancy. These quick office visits may seem simple and routine. But they can help you have a safe and healthy pregnancy. Your doctor is watching for problems that can only be found through regular checkups. The visits also give you and your doctor time to build a good relationship.  After your first visit, you will most likely start on a schedule of monthly visits. In your third trimester, the visits will get more frequent. Based on your health, your age, and if you've had a normal, full-term pregnancy before, your doctor may want to see you more or less often.  At different times in your pregnancy, you will have exams and tests. Some are routine. Others are done only when there is a chance of a problem. Everything healthy you do for your body helps you have a healthy pregnancy. Rest when you need it. Eat well, drink plenty of water, and exercise regularly.  What happens during a prenatal visit?  You will have blood pressure checks, along with urine tests. You also may have blood tests. If you need to go to the bathroom while waiting for the doctor, tell the nurse. You will be given a " sample cup so your urine can be tested.  You will be weighed and have your belly measured.  Your doctor may listen to the fetal heartbeat with a special device.  At about 24 weeks, and possibly earlier in your pregnancy, your doctor will check your blood sugar (glucose tolerance test) for diabetes that can occur during pregnancy. This is gestational diabetes, which can be harmful.  You will have tests to check for infections that could harm your . These include group B streptococcus and hepatitis B.  Your doctor may do ultrasounds to check for problems. This also checks the position of the fetus. An ultrasound uses sound waves to produce a picture of the fetus.  You may get your vaccines updated.  Your doctor may ask you questions to check for signs of anxiety or depression. Tell your doctor if you feel sad, anxious, or hopeless for more than a few days.  You may have other tests at any time during your pregnancy.  Use your visits to discuss with your doctor any concerns you have.  How can you care for yourself at home?  Get plenty of rest.  Try to exercise every day, if your doctor says it is okay. If you have not exercised in the past, start out slowly. For example, you can take short walks each day.  Choose healthy foods, such as fruits, vegetables, whole grains, lean proteins, low-fat dairy, and healthy fats.  Drink plenty of fluids. Cut down on drinks with caffeine, such as coffee, tea, and cola. If you have kidney, heart, or liver disease and have to limit fluids, talk with your doctor before you increase the amount of fluids you drink.  Try to avoid chemical fumes, paint fumes, and poisons.  If you smoke, vape, or use alcohol, marijuana, or other drugs, quit or cut back as much as you can. Talk to your doctor if you need help quitting.  Review all of your medicines, including over-the-counter medicines and supplements, with your doctor. Some of your routine medicines may need to be changed. Do not stop  "or start taking any medicines without talking to your doctor first.  Follow-up care is a key part of your treatment and safety. Be sure to make and go to all appointments, and call your doctor if you are having problems. It's also a good idea to know your test results and keep a list of the medicines you take.  Where can you learn more?  Go to https://www.ONOFFMIX (?????).net/patiented  Enter J502 in the search box to learn more about \"Learning About Prenatal Visits.\"  Current as of: April 30, 2024  Content Version: 14.4    7618-6449 [employer].   Care instructions adapted under license by your healthcare professional. If you have questions about a medical condition or this instruction, always ask your healthcare professional. [employer] disclaims any warranty or liability for your use of this information.    Intimate Partner Violence: Care Instructions  Overview     If you want to save this information but don't think it is safe to take it home, see if a trusted friend can keep it for you. Plan ahead. Know who you can call for help, and memorize the phone number.   Be careful online too. Your online activity may be seen by others. Do not use your personal computer or device to read about this topic. Use a safe computer, such as one at work, a friend's home, or a library.    Intimate partner violence--a type of domestic abuse--is different from an argument now and then. It is a pattern of abuse that one person may use to control another person's behavior. It may start with threats and name-calling. Then, it may lead to more serious acts, like pushing and slapping. The abuse also may occur in other areas. For example, the abuser may withhold money or spend a partner's money without their knowledge.  Abuse can cause serious harm. You are more likely to have a long-term health problem from the injuries and stress of living in a violent relationship. People who are sexually abused by their partners " have more sexually transmitted infections and unplanned pregnancies. Anyone who is abused also faces emotional pain. Anyone can be abused in relationships. In some relationships, both people use abusive behavior.  If you are pregnant, abuse can cause problems such as poor weight gain, infections, and bleeding. Abuse during this time may increase your baby's risk of low birth weight, premature birth, and death.  Follow-up care is a key part of your treatment and safety. Be sure to make and go to all appointments, and call your doctor if you are having problems. It's also a good idea to know your test results and keep a list of the medicines you take.  How can you care for yourself at home?  If you do not have a safe place to stay, discuss this with your doctor before you leave.  Have a plan for where to go, how to leave your home, and where to stay in case of an emergency. Do not tell your partner about your plan. Contact:  The National Domestic Violence Hotline toll-free at 1-238.873.8170. They can help you find resources in your area.  Your local police department, hospital, or clinic for information about shelters and safe homes near you.  Talk to a trusted friend or neighbor, a counselor, or a khadijah leader. Do not feel that you have to hide what happened.  Teach your children how to call for help in an emergency.  Be alert to warning signs, such as threats, heavy alcohol use, or drug use. This can help you avoid danger.  If you can, make sure that there are no guns or other weapons in your home.  When should you call for help?   Call 911 anytime you think you may need emergency care. For example, call if:    You or someone else has just been abused.     You think you or someone else is in danger of being abused.   Watch closely for changes in your health, and be sure to contact your doctor if you have any problems.  Where can you learn more?  Go to https://www.healthwise.net/patiented  Enter G282 in the search  "box to learn more about \"Intimate Partner Violence: Care Instructions.\"  Current as of: July 31, 2024  Content Version: 14.4    6281-8794 [employer].   Care instructions adapted under license by your healthcare professional. If you have questions about a medical condition or this instruction, always ask your healthcare professional. [employer] disclaims any warranty or liability for your use of this information.    Intimate Partner Violence Safety Instructions: Care Instructions  Overview     If you want to save this information but don't think it is safe to take it home, see if a trusted friend can keep it for you. Plan ahead. Know who you can call for help, and memorize the phone number.   Be careful online too. Your online activity may be seen by others. Do not use your personal computer or device to read about this topic. Use a safe computer, such as one at work, a friend's home, or a library.    When you are abused by a spouse or partner, you can take actions to protect yourself and your children.  You can increase your safety whether you decide to stay with your spouse or partner or you decide to leave. You may want to make a safety plan and pack a bag ahead of time. This will help you leave quickly when you decide to. Remember, you cannot change your partner's actions, but you can find help for you and your children. No one deserves to be abused.  Follow-up care is a key part of your treatment and safety. Be sure to make and go to all appointments, and call your doctor if you are having problems. It's also a good idea to know your test results and keep a list of the medicines you take.  How can you care for yourself at home?  Make a plan for your safety   If you decide to stay with your abusive spouse or partner, you can do the following to increase your safety:  Decide what works best to keep you safe in an emergency.  Know who you can call to help you in an emergency.  Decide if you " will call the police if you get hurt again. If you can, agree on a signal with your children or neighbor to call the police for you if you need help. You can flash lights or hang something out of a window.  Choose a safe place to go for a short time if you need to leave home. Memorize the address and phone number.  Learn escape routes out of your home in case you need to leave in a hurry. Teach your children different ways to get out of your home quickly if they need to.  If you can, hide or lock up things that can be used as weapons (guns, knives, hammers).  Learn the number of a domestic violence shelter. Talk to the people there about how they can help.  Find out about other community resources that can help you.  Take pictures of bruises or other injuries if you can. You can also take pictures of things your abuser has broken.  Teach your children that violence is never okay. Tell them that they do not deserve to be hurt.  Pack a bag   Prepare a bag with things you will need if you leave suddenly. Leave it with a friend, a relative, or someone else you trust. You could include the following items in the bag:  A set of keys to your home and car.  Emergency phone numbers and addresses.  Money such as cash or checks. You can also ask a friend, a relative, or someone else you trust to hold money for you.  Copies of legal documents such as house and car titles or rent receipts, birth certificates, Social Security card, voter registration, marriage and 's licenses, and your children's health records.  Personal items you would need for a few days, such as clothes, a toothbrush, toothpaste, and any medicines you or your children need.  A favorite toy or book for your child or children.  Diapers and bottles, if you have very young children.  Pictures that show signs of abuse and violence. You may also add pictures of your abuser.  If you leave   If you decide to leave, you can take the following steps:  Go to the  "emergency room at a hospital if you have been hurt.  Think about asking the police to be with you as you leave. They can protect you as you leave your home.  If you decide to leave secretly, remember that activities can be tracked. Your abuser may still have access to your cell phone, email, and credit cards. It may be possible for these to be traced. Always be aware of your surroundings.  If this is an emergency, do not worry about gathering up anything. Just leave--your safety is most important.  If your abuser moves out, change the locks on the doors. If you have a security system, change the access code.  When should you call for help?   Call 911 anytime you think you may need emergency care. For example, call if:    You or someone else has just been abused.     You think you or someone else is in danger of being abused.   Watch closely for changes in your health, and be sure to contact your doctor if you have any problems.  Where can you learn more?  Go to https://www.Agolo.net/patiented  Enter A752 in the search box to learn more about \"Intimate Partner Violence Safety Instructions: Care Instructions.\"  Current as of: July 31, 2024  Content Version: 14.4    7016-0044 [employer].   Care instructions adapted under license by your healthcare professional. If you have questions about a medical condition or this instruction, always ask your healthcare professional. [employer] disclaims any warranty or liability for your use of this information.    Learning About Intimate Partner Violence  What is intimate partner violence?  Intimate partner violence is a type of domestic abuse. It's threatening, emotionally harmful, or violent behavior in a personal relationship. It can happen between past or current partners or spouses. In some relationships both people abuse each other. One partner may be more abusive. Or the abuse may be equal.  Abuse can affect people of any ethnic group, race, or " Mandaen. It can affect teens, adults, or the elderly. And it can happen to people of any sexual orientation, gender, or social status.  Abusers use fear, bullying, and threats to control their partners. They may control what their partners do. They may control where their partners go or who they see. They may act jealous, controlling, or possessive. These early signs of abuse may happen soon after the start of the relationship. Sometimes it can be hard to notice abuse at first. But after the relationship becomes more serious, the abuse may get worse.  If you are being abused in your relationship, it's important to get help. The abuse is not your fault. You don't have to face it alone.  Be careful  It may not be safe to take home domestic abuse information like this handout. Some people ask a trusted friend to keep it for them. It's also important to plan ahead and to memorize the phone number of places you can go for help. If you are concerned about your safety, do not use your computer, smartphone, or tablet to read about domestic abuse.   What are the types of intimate partner violence?  Abuse can happen in different ways. Each type can happen on its own or in combination with others.  Emotional abuse  Emotional abuse is a pattern of threats, insults, or controlling behavior. It includes verbal abuse. It goes beyond healthy disagreements in a relationship. It's a sign of an unhealthy relationship.  Do you feel threatened, intimidated, or controlled?  Does your partner:  Threaten your children, other family members, or pets?  Use jokes meant to embarrass or shame you?  Call you names?  Tell you that you are a bad parent?  Threaten to take away your children?  Threaten to have you or your family members deported?  Control your access to money or other basic needs?  Control what you do, who you see or talk to, or where you go?  Another form of emotional abuse is denying that it is happening. Or the abuser may act  like the abuse is no big deal or is your fault.  Sexual abuse  With sexual abuse, abusers may try to convince or force you to have sex. They may force you into sex acts you're not comfortable with. Or they may sexually assault you. Sexual abuse can happen even if you are in a committed relationship.  Physical abuse  Physical abuse means that a partner hits, kicks, or does something else to physically hurt you. Physical abuse that starts with a slap might lead to kicking, shoving, and choking over time. The abuser may also threaten to hurt or kill you.  Stalking  Stalking means that an abuser gives you attention that you do not want and that causes you fear. Examples of stalking include:  Following you.  Showing up at places where the abuser isn't invited, such as at your work or school.  Constantly calling or texting you.  What problems can  to?  Intimate partner violence can be very dangerous. It can cause serious, repeated injury. It can even lead to death.  All forms of abuse can cause long-term health problems from the stress of a violent relationship. Verbal abuse can lead to sexual and physical abuse.  Abuse causes:  Emotional pain.  Depression.  Anxiety.  Post-traumatic stress.  Sexual abuse can lead to sexually transmitted infections (such as HIV/AIDS) and unplanned pregnancy.  Pregnancy can be a very dangerous time for people in abusive relationships. Abuse can cause or increase the risk of problems during pregnancy. These include low weight gain, anemia, infections, and bleeding. Abuse may also increase your baby's risk of low birth weight, premature birth, and death.  It can be hard for some victims of abuse to ask for help or to leave their relationship. You may feel scared, stuck, or not sure what steps to take. But it's important not to ignore abuse. Talking to someone you trust could be the first step to ending the abuse and taking care of your own health and happiness again. There are  "resources available that can help keep you safe.  Where can you get help?  Talk to a trusted friend. Find a local advocacy group, or talk to your doctor about the abuse.  Contact the National Domestic Violence Hotline at 0-838-427-VABA (1-230.722.9167) for more safety tips. They can guide you to groups in your area that can help. Or go to the National Coalition Against Domestic Violence website at www.theEloquii.org to learn more.  Domestic violence groups or a counselor in your area can help you make a safety plan for yourself and your children.  When to call for help  Call 911 anytime you think you may need emergency care. For example, call if:  You think that you or someone you know is in danger of being abused.  You have been hurt and can't have someone safely take you to emergency care.  You have just been abused.  A family member has just been abused.  Where can you learn more?  Go to https://www.Figment.net/patiented  Enter S665 in the search box to learn more about \"Learning About Intimate Partner Violence.\"  Current as of: July 31, 2024  Content Version: 14.4    9283-7767 [employer].   Care instructions adapted under license by your healthcare professional. If you have questions about a medical condition or this instruction, always ask your healthcare professional. [employer] disclaims any warranty or liability for your use of this information.    Vaginal Bleeding During Pregnancy: Care Instructions  Overview     It's common to have some vaginal spotting when you are pregnant. In some cases, the bleeding isn't serious. And there aren't any more problems with the pregnancy.  But sometimes bleeding is a sign of a more serious problem. This is more common if the bleeding is heavy or painful. Examples of more serious problems include miscarriage, an ectopic pregnancy, and a problem with the placenta.  You may have to see your doctor again to be sure everything is okay. You may also " need more tests to find the cause of the bleeding.  Home treatment may be all you need. But it depends on what is causing the bleeding. Be sure to tell your doctor if you have any new symptoms or if your symptoms get worse.  The doctor has checked you carefully, but problems can develop later. If you notice any problems or new symptoms, get medical treatment right away.  Follow-up care is a key part of your treatment and safety. Be sure to make and go to all appointments, and call your doctor if you are having problems. It's also a good idea to know your test results and keep a list of the medicines you take.  How can you care for yourself at home?  If your doctor prescribed medicines, take them exactly as directed. Call your doctor if you think you are having a problem with your medicine.  Do not have vaginal sex until your doctor says it's okay.  Do not put anything in your vagina until your doctor says it's okay.  Ask your doctor about other activities you can or can't do.  Get a lot of rest. Being pregnant can make you tired.  Do not use nonsteroidal anti-inflammatory drugs (NSAIDs), such as ibuprofen (Advil, Motrin), naproxen (Aleve), or aspirin, unless your doctor says it is okay.  When should you call for help?   Call 911 anytime you think you may need emergency care. For example, call if:    You passed out (lost consciousness).     You have severe vaginal bleeding. This means you are soaking through a pad each hour for 2 or more hours.     You have sudden, severe pain in your belly or pelvis.   Call your doctor now or seek immediate medical care if:    You have new or worse vaginal bleeding.     You are dizzy or lightheaded, or you feel like you may faint.     You have pain in your belly, pelvis, or lower back.     You think that you are in labor.     You have a sudden release of fluid from your vagina.     You've been having regular contractions for an hour. This means that you've had at least 8  contractions within 1 hour or at least 4 contractions within 20 minutes, even after you change your position and drink fluids.     You notice that your baby has stopped moving or is moving much less than normal.   Watch closely for changes in your health, and be sure to contact your doctor if you have any problems.  Current as of: April 30, 2024  Content Version: 14.4    4460-0183 [employer].   Care instructions adapted under license by your healthcare professional. If you have questions about a medical condition or this instruction, always ask your healthcare professional. [employer] disclaims any warranty or liability for your use of this information.  Weeks 10 to 14 of Your Pregnancy: Care Instructions  It's now possible to hear the fetus's heartbeat with a special ultrasound device. And the fetus's organs are developing.    Decide about tests to check for birth defects. Think about your age, your chance of passing on a family disease, your need to know about any problems, and what you might do after you have the test results.   It's okay to exercise. Try activities such as walking or swimming. Check with your doctor before starting a new program.     You may feel more tired than usual.  Taking naps during the day may help.     You may feel emotional.  It might help to talk to someone.     You may have headaches.  Try lying down and putting a cool cloth over your forehead.     You can use acetaminophen (Tylenol) for pain relief.  Don't take any anti-inflammatory medicines (such as Advil, Motrin, Aleve), unless your doctor says it's okay.     You may feel a fullness or aching in your lower belly.  This can feel like the kind of cramps you might get before a period. A back rub may help.     You may need to urinate more.  Your growing uterus and changing hormones can affect your bladder.     You may feel sick to your stomach (morning sickness).  Try avoiding food and smells that make you feel  "sick.     Your breasts may feel different.  They may feel tender or get bigger. Your nipples may get darker. Try a bra that gives you good support.     Avoid alcohol, tobacco, and drugs (including marijuana).  If you need help quitting, talk to your doctor.     Take a daily prenatal vitamin.  Choose one with folic acid.   Follow-up care is a key part of your treatment and safety. Be sure to make and go to all appointments, and call your doctor if you are having problems. It's also a good idea to know your test results and keep a list of the medicines you take.  Where can you learn more?  Go to https://www.Netcordia.net/patiented  Enter E090 in the search box to learn more about \"Weeks 10 to 14 of Your Pregnancy: Care Instructions.\"  Current as of: April 30, 2024  Content Version: 14.4    3479-9820 [employer].   Care instructions adapted under license by your healthcare professional. If you have questions about a medical condition or this instruction, always ask your healthcare professional. [employer] disclaims any warranty or liability for your use of this information.      Nutrition During Pregnancy: Care Instructions  Overview     Healthy eating when you are pregnant is important for you and your baby. It can help you feel well and have a successful pregnancy and delivery. During pregnancy your nutrition needs increase. Even if you have excellent eating habits, your doctor may recommend a multivitamin to make sure you get enough iron and folic acid.  You may wonder how much weight you should gain. In general, if you were at a healthy weight before you became pregnant, then you should gain between 25 and 35 pounds. If you were overweight before pregnancy, then you'll likely be advised to gain 15 to 25 pounds. If you were underweight before pregnancy, then you'll probably be advised to gain 28 to 40 pounds. Your doctor will work with you to set a weight goal that is right for you. Gaining a " healthy amount of weight helps you have a healthy baby.  Follow-up care is a key part of your treatment and safety. Be sure to make and go to all appointments, and call your doctor if you are having problems. It's also a good idea to know your test results and keep a list of the medicines you take.  How can you care for yourself at home?  Eat plenty of fruits and vegetables. Include a variety of orange, yellow, and leafy dark-green vegetables every day.  Choose whole-grain bread, cereal, and pasta. Good choices include whole wheat bread, whole wheat pasta, brown rice, and oatmeal.  Get 4 or more servings of milk and milk products each day. Good choices include nonfat or low-fat milk, yogurt, and cheese. If you cannot eat milk products, you can get calcium from calcium-fortified products such as orange juice, soy milk, and tofu. Other non-milk sources of calcium include leafy green vegetables, such as broccoli, kale, mustard greens, turnip greens, bok maya, and brussels sprouts.  If you eat meat, pick lower-fat types. Good choices include lean cuts of meat and chicken or turkey without the skin.  Avoid fish that are high in mercury. These include shark, swordfish, mickey mackerel, marlin, orange roughy, and bigeye tuna, as well as tilefish from the [facility].  It's okay to eat up to 8 to 12 ounces a week of fish that are low in mercury or up to 4 ounces a week of fish that have medium levels of mercury. Some fish that are low in mercury are salmon, shrimp, canned light tuna, cod, and tilapia. Some fish that have medium levels of mercury are halibut and white albacore tuna.  For more advice about eating fish, you can visit the U.S. Food and Drug Administration (FDA) or U.S. Environmental Protection Agency (EPA) website.  Heat lunch meats (such as turkey, ham, or bologna) to 165 F before you eat them. This reduces your risk of getting sick from a kind of bacteria that can be found in lunch meats.  Do not eat  "unpasteurized soft cheeses, such as brie, feta, fresh mozzarella, and blue cheese. They have a bacteria that could harm your baby.  Limit caffeine to about 200 to 300 mg per day. On average, a cup of brewed coffee has around 80 to 100 mg of caffeine.  Do not drink any alcohol. No amount of alcohol has been found to be safe during pregnancy.  Do not diet or try to lose weight. For example, do not follow a low-carbohydrate diet. If you are overweight at the start of your pregnancy, your doctor will work with you to manage your weight gain.  Tell your doctor about all vitamins and supplements you take.  When should you call for help?  Watch closely for changes in your health, and be sure to contact your doctor if you have any problems.  Where can you learn more?  Go to https://www.[employer].WalkMe/patiented  Enter Y785 in the search box to learn more about \"Nutrition During Pregnancy: Care Instructions.\"  Current as of: October 7, 2024  Content Version: 14.4    2345-4968 [employer].   Care instructions adapted under license by your healthcare professional. If you have questions about a medical condition or this instruction, always ask your healthcare professional. [employer] disclaims any warranty or liability for your use of this information.    Exercise During Pregnancy: Care Instructions  Overview     Exercise is good for you during a healthy pregnancy. It can relieve back pain, swelling, and other discomforts. It also prepares your muscles for childbirth. And exercise can improve your energy level and help you sleep better.  If your doctor advises it, get more exercise. For example, walking is a good choice. Bit by bit, increase the amount you walk every day. Try for at least 30 minutes on most days of the week. You could also try a prenatal exercise class. But if you do not already exercise, be sure to talk with your doctor before you start a new exercise program. Doctors do not recommend " "contact sports during pregnancy.  Follow-up care is a key part of your treatment and safety. Be sure to make and go to all appointments, and call your doctor if you are having problems. It's also a good idea to know your test results and keep a list of the medicines you take.  How can you care for yourself at home?  Talk with your doctor about the right kind of exercise for each stage of pregnancy.  Listen to your body to know if your exercise is at a safe level.  Do not become overheated while you exercise. High body temperature can be harmful. Avoid activities that can make your body too hot.  If you feel tired, take it easy. You might walk instead of run.  If you are used to strenuous exercise, ask your doctor how to know when it's time to slow down.  If you exercised before getting pregnant, you should be able to keep up your routine early in your pregnancy. Later in your pregnancy, you may want to switch to more gentle activities.  Drink plenty of fluids before, during, and after exercise.  Avoid contact sports, such as soccer and basketball. Also avoid risky activities. These include scuba diving, horseback riding, and exercising at a high altitude (above 6,000 feet). If you live in a place with a high altitude, talk to your doctor about how you can exercise safely.  Do not get overtired while you exercise. You should be able to talk while you work out.  After your fourth month of pregnancy, avoid exercises that require you to lie flat on your back on a hard surface. These include sit-ups and some yoga poses.  Get plenty of rest. You may be very tired while you are pregnant.  Where can you learn more?  Go to https://www.[employer].net/patiented  Enter S801 in the search box to learn more about \"Exercise During Pregnancy: Care Instructions.\"  Current as of: April 30, 2024  Content Version: 14.4    6249-1074 [employer].   Care instructions adapted under license by your healthcare professional. If you " have questions about a medical condition or this instruction, always ask your healthcare professional. [employer] disclaims any warranty or liability for your use of this information.    Learning About Pregnancy When You Are Underweight or Overweight  How does your weight affect your pregnancy?    The basics of prenatal care are the same for everyone, regardless of size. You'll get what you need to have a healthy baby.  But if you are not at a weight that is healthy for you, it can make a difference in a few things. Being underweight or overweight can increase the chances of some problems during pregnancy. So your doctor or midwife will pay close attention to your health and your baby's health. You may have some extra doctor or midwife visits and tests. And you may have some tests earlier in your pregnancy.  Work with your doctor or midwife to get the care you need. Go to all your doctor or midwife visits, and follow their advice about what to do and what to avoid during pregnancy.  How much weight gain is healthy during pregnancy?  There's no fixed number of pounds that you should be aiming for. Instead, there's a range of weight gain that's good for you and your baby. Based on your weight before pregnancy, experts say it's generally best to gain about:  28 lb (13 kg) to 40 lb (18 kg) if you're underweight.  25 lb (11 kg) to 35 lb (16 kg) if you're at a healthy weight.  15 lb (7 kg) to 25 lb (11 kg) if you're overweight.  11 lb (5 kg) to 20 lb (9 kg) if you're very overweight (obese). In some cases, a doctor may recommend that you don't gain any weight.  If you have questions about weight gain during pregnancy, talk with your doctor about what's right for you. Gaining a healthy amount of weight helps you have a healthy pregnancy.  How much extra food do you need to eat?  How much food you need to eat during pregnancy depends on:  Your height.  How much you weigh when you get pregnant.  How active you  "are.  If you're carrying more than one fetus (multiple pregnancy).  In the first trimester, you'll probably need the same amount of calories as you did before you were pregnant. In general, in your second trimester, you need to eat about 340 extra calories a day. In your third trimester, you need to eat about 450 extra calories a day.  What can you do to have a healthy pregnancy?  The best things you can do for you and your baby are to eat healthy foods, get regular exercise, avoid alcohol and smoking, and go to your doctor or midwife visits.  Eat a variety of foods from all the food groups. Make sure to get enough calcium and folic acid. Ask your doctor or midwife how much folic acid you should be taking.  You may want to work with a dietitian to help you plan healthy meals to get the right amount of calories for you.  Talk to your doctor or midwife about how you can exercise safely. If you didn't exercise much before you got pregnant, talk to your doctor or midwife about how you can slowly get more active. They may want to set up an exercise program with you.  Where can you learn more?  Go to https://www.[employer].net/patiented  Enter B644 in the search box to learn more about \"Learning About Pregnancy When You Are Underweight or Overweight.\"  Current as of: April 30, 2024  Content Version: 14.4    5250-4757 [employer].   Care instructions adapted under license by your healthcare professional. If you have questions about a medical condition or this instruction, always ask your healthcare professional. [employer] disclaims any warranty or liability for your use of this information.    You have been provided the CDC Warning Signs in Pregnancy document.    Additional copies can be found here: www.[employer].com/768451.pdf  "

## 2025-06-03 NOTE — PROGRESS NOTES
Telephone visit with patient for New Prenatal Intake and Education. This is patient's second pregnancy. Handouts reviewed and will be provided at next prenatal appointment. Scheduled for New Prenatal with Dr. Sifuentes on 2025.     Pt interested in NIPT, desires -is doing pelvic floor therapy      Prenatal OB Questionnaire  Patient supplied answers from flow sheet for:  Prenatal OB Questionnaire.  Past Medical History  Have you ever recieved care for your mental health? : (!) (Patient-Rptd) Yes  Have you ever been in a major accident or suffered serious trauma?: (Patient-Rptd) No  Within the last year, has anyone hit, slapped, kicked or otherwise hurt you?: (Patient-Rptd) No  In the last year, has anyone forced you to have sex when you didn't want to?: (Patient-Rptd) No    Past Medical History 2   Have you ever received a blood transfusion?: (Patient-Rptd) No  Would you accept a blood transfusion if was medically recommended?: (Patient-Rptd) Yes  Does anyone in your home smoke?: (!) (Patient-Rptd) Yes   Is your blood type Rh negative?: (Patient-Rptd) Unknown  Have you ever ?: (!) (Patient-Rptd) Yes  Have you been hospitalized for a nonsurgical reason excluding normal delivery?: (Patient-Rptd) No  Have you ever had an abnormal pap smear?: (Patient-Rptd) No    Past Medical History (Continued)  Do you have a history of abnormalities of the uterus?: (Patient-Rptd) No  Did your mother take IRASEMA or any other hormones when she was pregnant with you?: (Patient-Rptd) No  Do you have any other problems we have not asked about which you feel may be important to this pregnancy?: (Patient-Rptd) No    PHQ-2 Score:         3/18/2025    10:03 AM 3/27/2024    10:18 AM   PHQ-2 (  [employer])   Q1: Little interest or pleasure in doing things 0 1   Q2: Feeling down, depressed or hopeless 0 1   PHQ-2 Score 0  2   Q1: Little interest or pleasure in doing things Not at all Several days   Q2: Feeling down, depressed or  hopeless Not at all Several days   PHQ-2 Score 0 2       Patient-reported         Allergies as of 6/3/2025:    Allergies as of 06/03/2025 - Reviewed 06/03/2025   Allergen Reaction Noted    Seasonal allergies Headache 01/27/2020       Current medications are:  Current Outpatient Medications   Medication Sig Dispense Refill    Prenatal Vit-Fe Fumarate-FA (PRENATAL MULTIVITAMIN  PLUS IRON) 27-1 MG TABS Take by mouth daily.      Ascorbic Acid (VITAMIN C) 500 MG CHEW Take 2 tablets by mouth daily (Patient not taking: Reported on 6/3/2025)      Bioflavonoid Products (VITAMIN C) CHEW  (Patient not taking: Reported on 6/3/2025)      Multiple Vitamins-Minerals (MULTIVITAL PO)  (Patient not taking: Reported on 6/3/2025)

## 2025-06-04 ENCOUNTER — RESULTS FOLLOW-UP (OUTPATIENT)
Dept: OBGYN | Facility: CLINIC | Age: 25
End: 2025-06-04

## 2025-06-04 ENCOUNTER — ANCILLARY PROCEDURE (OUTPATIENT)
Dept: ULTRASOUND IMAGING | Facility: CLINIC | Age: 25
End: 2025-06-04
Attending: OBSTETRICS & GYNECOLOGY
Payer: COMMERCIAL

## 2025-06-04 ENCOUNTER — PRENATAL OFFICE VISIT (OUTPATIENT)
Dept: OBGYN | Facility: CLINIC | Age: 25
End: 2025-06-04
Payer: COMMERCIAL

## 2025-06-04 VITALS
BODY MASS INDEX: 28.16 KG/M2 | WEIGHT: 167.6 LBS | HEART RATE: 74 BPM | OXYGEN SATURATION: 100 % | DIASTOLIC BLOOD PRESSURE: 62 MMHG | SYSTOLIC BLOOD PRESSURE: 114 MMHG

## 2025-06-04 DIAGNOSIS — O34.219 PREGNANCY WITH HISTORY OF CESAREAN SECTION, ANTEPARTUM: ICD-10-CM

## 2025-06-04 DIAGNOSIS — Z81.8 FAMILY HISTORY OF AUTISTIC DISORDER: ICD-10-CM

## 2025-06-04 DIAGNOSIS — Z29.13 NEED FOR RHOGAM DUE TO RH NEGATIVE MOTHER: ICD-10-CM

## 2025-06-04 DIAGNOSIS — O34.219 PREVIOUS CESAREAN DELIVERY, ANTEPARTUM CONDITION OR COMPLICATION: Primary | ICD-10-CM

## 2025-06-04 LAB
ALBUMIN UR-MCNC: NEGATIVE MG/DL
APPEARANCE UR: CLEAR
BILIRUB UR QL STRIP: NEGATIVE
C TRACH DNA SPEC QL PROBE+SIG AMP: NEGATIVE
COLOR UR AUTO: ABNORMAL
ERYTHROCYTE [DISTWIDTH] IN BLOOD BY AUTOMATED COUNT: 12.8 % (ref 10–15)
EST. AVERAGE GLUCOSE BLD GHB EST-MCNC: 97 MG/DL
GLUCOSE UR STRIP-MCNC: NEGATIVE MG/DL
HBA1C MFR BLD: 5 % (ref 0–5.6)
HBV SURFACE AG SERPL QL IA: NONREACTIVE
HCT VFR BLD AUTO: 35.3 % (ref 35–47)
HCV AB SERPL QL IA: NONREACTIVE
HGB BLD-MCNC: 12 G/DL (ref 11.7–15.7)
HGB UR QL STRIP: NEGATIVE
HIV 1+2 AB+HIV1 P24 AG SERPL QL IA: NONREACTIVE
KETONES UR STRIP-MCNC: NEGATIVE MG/DL
LEUKOCYTE ESTERASE UR QL STRIP: NEGATIVE
MCH RBC QN AUTO: 28.8 PG (ref 26.5–33)
MCHC RBC AUTO-ENTMCNC: 34 G/DL (ref 31.5–36.5)
MCV RBC AUTO: 85 FL (ref 78–100)
N GONORRHOEA DNA SPEC QL NAA+PROBE: NEGATIVE
NITRATE UR QL: NEGATIVE
PH UR STRIP: 7.5 [PH] (ref 5–7)
PLATELET # BLD AUTO: 265 10E3/UL (ref 150–450)
RBC # BLD AUTO: 4.16 10E6/UL (ref 3.8–5.2)
RUBV IGG SERPL QL IA: 1.19 INDEX
RUBV IGG SERPL QL IA: POSITIVE
SP GR UR STRIP: 1.01 (ref 1–1.03)
SPECIMEN TYPE: NORMAL
T PALLIDUM AB SER QL: NONREACTIVE
UROBILINOGEN UR STRIP-MCNC: NORMAL MG/DL
WBC # BLD AUTO: 8.7 10E3/UL (ref 4–11)

## 2025-06-04 PROCEDURE — 86762 RUBELLA ANTIBODY: CPT | Performed by: OBSTETRICS & GYNECOLOGY

## 2025-06-04 PROCEDURE — 83036 HEMOGLOBIN GLYCOSYLATED A1C: CPT | Performed by: OBSTETRICS & GYNECOLOGY

## 2025-06-04 PROCEDURE — 76801 OB US < 14 WKS SINGLE FETUS: CPT | Performed by: RADIOLOGY

## 2025-06-04 PROCEDURE — 85027 COMPLETE CBC AUTOMATED: CPT | Performed by: OBSTETRICS & GYNECOLOGY

## 2025-06-04 PROCEDURE — 87491 CHLMYD TRACH DNA AMP PROBE: CPT | Performed by: OBSTETRICS & GYNECOLOGY

## 2025-06-04 PROCEDURE — 86850 RBC ANTIBODY SCREEN: CPT | Performed by: OBSTETRICS & GYNECOLOGY

## 2025-06-04 PROCEDURE — 86780 TREPONEMA PALLIDUM: CPT | Performed by: OBSTETRICS & GYNECOLOGY

## 2025-06-04 PROCEDURE — 87591 N.GONORRHOEAE DNA AMP PROB: CPT | Performed by: OBSTETRICS & GYNECOLOGY

## 2025-06-04 PROCEDURE — 81003 URINALYSIS AUTO W/O SCOPE: CPT | Performed by: OBSTETRICS & GYNECOLOGY

## 2025-06-04 PROCEDURE — 86900 BLOOD TYPING SEROLOGIC ABO: CPT | Performed by: OBSTETRICS & GYNECOLOGY

## 2025-06-04 PROCEDURE — 87340 HEPATITIS B SURFACE AG IA: CPT | Performed by: OBSTETRICS & GYNECOLOGY

## 2025-06-04 PROCEDURE — 76817 TRANSVAGINAL US OBSTETRIC: CPT | Performed by: RADIOLOGY

## 2025-06-04 PROCEDURE — 86901 BLOOD TYPING SEROLOGIC RH(D): CPT | Performed by: OBSTETRICS & GYNECOLOGY

## 2025-06-04 PROCEDURE — 87389 HIV-1 AG W/HIV-1&-2 AB AG IA: CPT | Performed by: OBSTETRICS & GYNECOLOGY

## 2025-06-04 PROCEDURE — 36415 COLL VENOUS BLD VENIPUNCTURE: CPT | Performed by: OBSTETRICS & GYNECOLOGY

## 2025-06-04 PROCEDURE — 86803 HEPATITIS C AB TEST: CPT | Performed by: OBSTETRICS & GYNECOLOGY

## 2025-06-04 NOTE — PROGRESS NOTES
April is a 25 year old   8w2d  weeks here for new ob visit.      See Ob questionnaire for pertinent components of HPI.  Current Issues include: none     OB History    Para Term  AB Living   2 1 1 0 0 1   SAB IAB Ectopic Multiple Live Births   0 0 0 0 1      # Outcome Date GA Lbr Morgan/2nd Weight Sex Type Anes PTL Lv   2 Current            1 Term 23 42w1d  3.655 kg (8 lb 0.9 oz) M CS-LTranv EPI, Spinal N DONTE      Complications: Failure to Progress in First Stage      Name: DAR LOPEZ-APRIL      Apgar1: 7  Apgar5: 9     Past Medical History:   Diagnosis Date    Anxiety      Past Surgical History:   Procedure Laterality Date     SECTION N/A 2023    Procedure:  SECTION;  Surgeon: Isaiah Castañeda MD;  Location: [facility] L[facility]    TONSILLECTOMY, ADENOIDECTOMY ADULT, COMBINED Bilateral 2024    Procedure: Bilateral Tonsillectomy and Adenoidectomy, ADULT;  Surgeon: Luis Armando Dawson MD;  Location: [facility] OR    wisdom teeth removal       Patient Active Problem List    Diagnosis Date Noted    Previous  delivery, antepartum condition or complication 2025     Priority: Medium    Tonsillitis 10/18/2023     Priority: Medium    Tonsil stone 10/18/2023     Priority: Medium    Normal labor 2023     Priority: Medium    History of 2019 novel coronavirus disease (COVID-19) 2021     Priority: Medium     Tested at [facility] 3/7/2021  She developed syncopal episodes 2020 so thought to be unrelated  Second case: 22      Recurrent major depressive disorder, in partial remission 2021     Priority: Medium    Irregular menstrual cycle 2021     Priority: Medium    Impaired attention 2021     Priority: Medium    Generalized anxiety disorder 2021     Priority: Medium    Chronic tension-type headache, not intractable 2021     Priority: Medium        Allergies   Allergen Reactions    Seasonal Allergies Headache     Current Outpatient  Medications   Medication Sig Dispense Refill    Prenatal Vit-Fe Fumarate-FA (PRENATAL MULTIVITAMIN  PLUS IRON) 27-1 MG TABS Take by mouth daily.      Ascorbic Acid (VITAMIN C) 500 MG CHEW Take 2 tablets by mouth daily (Patient not taking: Reported on 6/3/2025)      Bioflavonoid Products (VITAMIN C) CHEW  (Patient not taking: Reported on 6/3/2025)      Multiple Vitamins-Minerals (MULTIVITAL PO)  (Patient not taking: Reported on 6/3/2025)         Past Medical History of Father of Baby: none  No significant medical history    Physical Exam: /62 (BP Location: Right arm, Patient Position: Sitting, Cuff Size: Adult Regular)   Pulse 74   Wt 76 kg (167 lb 9.6 oz)   LMP 2025   SpO2 100%   BMI 28.16 kg/m    General: Well developed, well nourished female  Skin: Normal  HEENT: Normal  Neck: Supple,no adenopathy,thyroid normal  Abdomen: Benign,Soft, flat, non-tender,No masses, organomegaly,No inguinal nodes,Bowel sounds normoactive   Extremities: Normal  Neurological: Normal   Perineum: Intact   Vulva: Normal  Vagina: Normal mucosa, no discharge  Cervix: Parous, closed, mobile, no discharge  Uterus: 8 weeks, Normal shape, position and consistency   Adnexa: Normal  Rectum: deferred,  Bony Pelvis: Adequate       A/P 25 year old  at  8w2d weeks  (O34.219) Previous  delivery, antepartum condition or complication  (primary encounter diagnosis)  Comment: Reviewed risks and benefits of a trial of labor, including the risk of uterine rupture (1-2%), with risk of permanent fetal neurologic damage or fetal death.  Discussed the maternal risks including hemorrhage, injury to bladder and other structures.  Discussed potential outcomes: successful ,  section for distress/rupture, or for failure to progress and the relative likelihood of successful vaginal delivery.  Discussed the risks of  section including the risks of bleeding, infection and injury to bowel/bladder and other structures.   Discussed the recovery for both vaginal and  deliveries.    Plan: TOLAC    (O34.219) Pregnancy with history of  section, antepartum  Comment:   Plan:     - Discussed physician coverage, tertiary support, diet, exercise, weight gain, schedule of visits, routine and indicated ultrasounds, and childbirth education.    Options for  testing for chromosomal and birth defects were discussed with the patient.  Diagnostic tests include CVS and Amniocentesis.  We discussed that these tests are definitive but invasive and do carry a risk of fetal loss.   Screening tests include nuchal translucency/blood marker testing in the first trimester and quad screening in the second trimester.  We discussed that these are screening tests and not diagnostic tests and that false positives and negatives are a distinct possibility.  The patient wants NIPT testing at 10 weeks.  - Prenatal labs,  GC, Chlamydia   - Pap was not done    - Prenatal Vitamins    Iglesia Sifuentes MD FACOG

## 2025-06-04 NOTE — PATIENT INSTRUCTIONS
Learning About Pregnancy  Your Care Instructions     Your health in the early weeks of your pregnancy is particularly important for your baby's health. Take good care of yourself. Anything you do that harms your body can also harm your baby.  Make sure to go to all of your doctor appointments. Regular checkups will help keep you and your baby healthy.  How can you care for yourself at home?  Diet    Choose healthy foods like fruits, vegetables, whole grains, lean proteins, and healthy fats.     Choose foods that are good sources of calcium, iron, and folate. You can try dairy products, dark leafy greens, fortified orange juice and cereals, almonds, broccoli, dried fruit, and beans.     Do not skip meals or go for many hours without eating. If you are nauseated, try to eat a small, healthy snack every 2 to 3 hours.     Avoid fish that are high in mercury. These include shark, swordfish, mickey mackerel, marlin, orange roughy, and bigeye tuna, as well as tilefish from the [facility].     It's okay to eat up to 8 to 12 ounces a week of fish that are low in mercury or up to 4 ounces a week of fish that have medium levels of mercury. Some fish that are low in mercury are salmon, shrimp, canned light tuna, cod, and tilapia. Some fish that have medium levels of mercury are halibut and white albacore tuna.     Drink plenty of fluids. If you have kidney, heart, or liver disease and have to limit fluids, talk with your doctor before you increase the amount of fluids you drink.     Limit caffeine to about 200 to 300 mg per day. On average, a cup of brewed coffee has around 80 to 100 mg of caffeine.     Do not drink alcohol, such as beer, wine, or hard liquor.     Take a multivitamin that contains at least 400 micrograms (mcg) of folic acid to help prevent birth defects. Fortified cereal and whole wheat bread are good additional sources of folic acid.     Increase the calcium in your diet. Try to drink a quart of skim milk  each day. You may also take calcium supplements and choose foods such as cheese and yogurt.   Lifestyle    Make sure you go to your follow-up appointments.     Get plenty of rest. You may be unusually tired while you are pregnant.     Get at least 30 minutes of exercise on most days of the week. Walking is a good choice. If you have not exercised in the past, start out slowly. Take several short walks each day.     Do not smoke. If you need help quitting, talk to your doctor about stop-smoking programs. These can increase your chances of quitting for good.     Do not touch cat feces or litter boxes. Also, wash your hands after you handle raw meat, and fully cook all meat before you eat it. Wear gloves when you work in the yard or garden, and wash your hands well when you are done. Cat feces, raw or undercooked meat, and contaminated dirt can cause an infection that may harm your baby or lead to a miscarriage.     Avoid things that can make your body too hot and may be harmful to your baby, such as a hot tub or sauna. Or talk with your doctor before doing anything that raises your body temperature. Your doctor can tell you if it's safe.     Avoid chemical fumes, paint fumes, or poisons.     Do not use illegal drugs, marijuana, or alcohol.   Medicines    Review all of your medicines with your doctor. Some of your routine medicines may need to be changed to protect your baby.     Use acetaminophen (Tylenol) to relieve minor problems, such as a mild headache or backache or a mild fever with cold symptoms. Do not use nonsteroidal anti-inflammatory drugs (NSAIDs), such as ibuprofen (Advil, Motrin) or naproxen (Aleve), unless your doctor says it is okay.     Do not take two or more pain medicines at the same time unless the doctor told you to. Many pain medicines have acetaminophen, which is Tylenol. Too much acetaminophen (Tylenol) can be harmful.     Take your medicines exactly as prescribed. Call your doctor if you  "think you are having a problem with your medicine.   To manage morning sickness    Keep food in your stomach, but not too much at once. Try eating five or six small meals a day instead of three large meals.     For nausea when you wake up, eat a small snack, such as a couple of crackers or pretzels, before rising. Allow a few minutes for your stomach to settle before you slowly get up.     Try to avoid smells and foods that make you feel nauseated. High-fat or greasy foods, milk, and coffee may make nausea worse. Some foods that may be easier to tolerate include cold, spicy, sour, and salty foods.     Drink enough fluids. Water and other caffeine-free drinks are good choices.     Take your prenatal vitamins at night on a full stomach.     Try foods and drinks made with aleshia. Aleshia may help with nausea.     Get lots of rest. Morning sickness may be worse when you are tired.     Talk to your doctor about over-the-counter products, such as vitamin B6 or doxylamine, to help relieve symptoms.     Try a P6 acupressure wrist band. These anti-nausea wristbands help some people.   Follow-up care is a key part of your treatment and safety. Be sure to make and go to all appointments, and call your doctor if you are having problems. It's also a good idea to know your test results and keep a list of the medicines you take.  Where can you learn more?  Go to https://www.[employer]net/patiented  Enter E868 in the search box to learn more about \"Learning About Pregnancy.\"  Current as of: April 30, 2024  Content Version: 14.4    1351-9183 [employer].   Care instructions adapted under license by your healthcare professional. If you have questions about a medical condition or this instruction, always ask your healthcare professional. [employer] disclaims any warranty or liability for your use of this information.    Weeks 6 to 10 of Your Pregnancy: Care Instructions  During these weeks of pregnancy, your " "body goes through many changes. You may start to feel different, both in your body and your emotions. Each pregnancy is different, so there's no \"right\" way to feel. These early weeks are a time to make healthy choices for you and your pregnancy.    Take a daily prenatal vitamin. Choose one with folic acid in it.   Avoid alcohol, tobacco, and drugs (including marijuana). If you need help quitting, talk to your doctor.     Drink plenty of liquids.  Be sure to drink enough water. And limit sodas, other sweetened drinks, and caffeine.     Choose foods that are good sources of calcium, iron, and folate.  You can try dairy products, dark leafy greens, fortified orange juice and cereals, almonds, broccoli, dried fruit, and beans.     Avoid foods that may be harmful.  Don't eat raw meat, deli meat, raw seafood, or raw eggs. Avoid soft cheese and unpasteurized dairy, like Brie and blue cheese. And don't eat fish that contains a lot of mercury, like shark and swordfish.     Don't touch zeb litter or cat poop.  They can cause an infection that could be harmful during pregnancy.     Avoid things that can make your body too hot.  For example, avoid hot tubs and saunas.     Soothe morning sickness.  Try eating 5 or 6 small meals a day, getting some fresh air, or using madelyn to control symptoms.     Ask your doctor about flu and COVID-19 shots.  Getting them can help protect against infection.   Follow-up care is a key part of your treatment and safety. Be sure to make and go to all appointments, and call your doctor if you are having problems. It's also a good idea to know your test results and keep a list of the medicines you take.  Where can you learn more?  Go to https://www.[employer].net/patiented  Enter G112 in the search box to learn more about \"Weeks 6 to 10 of Your Pregnancy: Care Instructions.\"  Current as of: April 30, 2024  Content Version: 14.4    3945-9648 [facility].   Care instructions adapted " under license by your healthcare professional. If you have questions about a medical condition or this instruction, always ask your healthcare professional. [employer] disclaims any warranty or liability for your use of this information.       Pregnancy: Managing Morning Sickness (01:48)  Your health professional recommends that you watch this short online health video.  Learn how to manage morning sickness during pregnancy.   Purpose: Learn how to manage morning sickness during pregnancy.  Goal: Learn how to manage morning sickness during pregnancy.    Watch: Scan the QR code or visit the link to view video       https://hwi.[email or web address]  Current as of: April 30, 2024  Content Version: 14.4    1608-1876 [employer].   Care instructions adapted under license by your healthcare professional. If you have questions about a medical condition or this instruction, always ask your healthcare professional. [employer] disclaims any warranty or liability for your use of this information.    Pregnancy and Heartburn: Care Instructions  Overview     Heartburn is a common problem during pregnancy.  Heartburn happens when stomach acid backs up into the tube that carries food to the stomach. This tube is called the esophagus. Early in pregnancy, heartburn is caused by hormone changes that slow down digestion. Later on, it's also caused by the large uterus pushing up on the stomach.  Even though you can't fix the cause, there are things you can do to get relief. Treating heartburn during pregnancy focuses first on making lifestyle changes, like changing what and how you eat, and on taking medicines.  Heartburn usually improves or goes away after childbirth.  Follow-up care is a key part of your treatment and safety. Be sure to make and go to all appointments, and call your doctor if you are having problems. It's also a good idea to know your test results and keep a list of the medicines you  "take.  How can you care for yourself at home?  Eat small, frequent meals.  Avoid foods that make your symptoms worse, such as chocolate, peppermint, and spicy foods. Avoid drinks with caffeine, such as coffee, tea, and sodas.  Avoid bending over or lying down after meals.  Take a short walk after you eat.  If heartburn is a problem at night, do not eat for 2 hours before bedtime.  Take antacids like Mylanta, Maalox, Rolaids, or Tums. Do not take antacids that have sodium bicarbonate, magnesium trisilicate, or aspirin. Be careful when you take over-the-counter antacid medicines. Many of these medicines have aspirin in them. While you are pregnant, do not take aspirin or medicines that contain aspirin unless your doctor says it is okay.  If you're not getting relief, talk to your doctor. You may be able to take a stronger acid-reducing medicine.  When should you call for help?   Call your doctor now or seek immediate medical care if:    You have new or worse belly pain.     You are vomiting.   Watch closely for changes in your health, and be sure to contact your doctor if:    You have new or worse symptoms of reflux.     You are losing weight.     You have trouble or pain swallowing.     You do not get better as expected.   Where can you learn more?  Go to https://www.[employer].net/patiented  Enter U946 in the search box to learn more about \"Pregnancy and Heartburn: Care Instructions.\"  Current as of: April 30, 2024  Content Version: 14.4    3022-4593 [employer].   Care instructions adapted under license by your healthcare professional. If you have questions about a medical condition or this instruction, always ask your healthcare professional. [employer] disclaims any warranty or liability for your use of this information.    Constipation: Care Instructions  Overview     Constipation means that you have a hard time passing stools (bowel movements). People pass stools from 3 times a day to " once every 3 days. What is normal for you may be different. Constipation may occur with pain in the rectum and cramping. The pain may get worse when you try to pass stools. Sometimes there are small amounts of bright red blood on toilet paper or the surface of stools. This is because of enlarged veins near the rectum (hemorrhoids).  A few changes in your diet and lifestyle may help you avoid ongoing constipation. Your doctor may also prescribe medicine to help loosen your stool.  Some medicines can cause constipation. These include pain medicines and antidepressants. Tell your doctor about all the medicines you take. Your doctor may want to make a medicine change to ease your symptoms.  Follow-up care is a key part of your treatment and safety. Be sure to make and go to all appointments, and call your doctor if you are having problems. It's also a good idea to know your test results and keep a list of the medicines you take.  How can you care for yourself at home?  Drink plenty of fluids. If you have kidney, heart, or liver disease and have to limit fluids, talk with your doctor before you increase the amount of fluids you drink.  Include high-fiber foods in your diet each day. These include fruits, vegetables, beans, and whole grains.  Get at least 30 minutes of exercise on most days of the week. Walking is a good choice. You also may want to do other activities, such as running, swimming, cycling, or playing tennis or team sports.  Take a fiber supplement, such as Citrucel or Metamucil, every day. Read and follow all instructions on the label.  Schedule time each day for a bowel movement. A daily routine may help. Take your time having a bowel movement, but don't sit for more than 10 minutes at a time. And don't strain too much.  Support your feet with a small step stool when you sit on the toilet. This helps flex your hips and places your pelvis in a squatting position.  Your doctor may recommend an  "over-the-counter laxative to relieve your constipation. Examples are Milk of Magnesia and MiraLax. Read and follow all instructions on the label. Do not use laxatives on a long-term basis.  When should you call for help?   Call your doctor now or seek immediate medical care if:    You have new or worse belly pain.     You have new or worse nausea or vomiting.     You have blood in your stools.   Watch closely for changes in your health, and be sure to contact your doctor if:    Your constipation is getting worse.     You do not get better as expected.   Where can you learn more?  Go to https://www.[employer].net/patiented  Enter P343 in the search box to learn more about \"Constipation: Care Instructions.\"  Current as of: October 19, 2024  Content Version: 14.4    3407-8133 [employer].   Care instructions adapted under license by your healthcare professional. If you have questions about a medical condition or this instruction, always ask your healthcare professional. [employer] disclaims any warranty or liability for your use of this information.    Learning About High-Iron Foods  What foods are high in iron?     The foods you eat contain nutrients, such as vitamins and minerals. Iron is a nutrient. Your body needs the right amount to stay healthy and work as it should. You can use the list below to help you make choices about which foods to eat.  Here are some foods that contain iron. They have 1 to 2 milligrams of iron per serving.  Fruits  Figs (dried), 5 figs  Vegetables  Asparagus (canned), 6 hou  Delmar, beet, Swiss chard, or turnip greens, 1 cup  Dried peas, cooked,   cup  Seaweed, spirulina (dried),   cup  Spinach, (cooked)   cup or (raw) 1 cup  Grains  Cereals, fortified with iron, 1 cup  Grits (instant, cooked), fortified with iron,   cup  Meats and other protein foods  Beans (kidney, lima, navy, white), canned or cooked,   cup  Beef or lamb, 3 oz  Chicken giblets, 3 " "oz  Chickpeas (garbanzo beans),   cup  Liver of beef, lamb, or pork, 3 oz  Oysters (cooked), 3 oz  Sardines (canned), 3 oz  Soybeans (boiled),   cup  Tofu (firm),   cup  Work with your doctor to find out how much of this nutrient you need. Depending on your health, you may need more or less of it in your diet.  Where can you learn more?  Go to https://www.[employer].net/patiented  Enter R005 in the search box to learn more about \"Learning About High-Iron Foods.\"  Current as of: October 7, 2024  Content Version: 14.4    3348-4922 [employer].   Care instructions adapted under license by your healthcare professional. If you have questions about a medical condition or this instruction, always ask your healthcare professional. [employer] disclaims any warranty or liability for your use of this information.    Rh Antibodies Screening During Pregnancy: About This Test  What is it?     The Rh antibodies screening test is a blood test. It checks your blood for Rh antibodies. If you have Rh-negative blood and have been exposed to Rh-positive blood, your immune system may make antibodies to attack the Rh-positive blood. When a pregnant woman has these antibodies, it is called Rh sensitization.  Why is this test done?  The Rh antibodies screening test is done during pregnancy to find out if your baby is at risk for Rh disease. This can happen if you have Rh-negative blood and your baby has Rh-positive blood. If your Rh-negative blood mixes with Rh-positive blood, your immune system will make antibodies to attack the Rh-positive blood.  During pregnancy, these antibodies could attach to the baby's red blood cells. This can cause your baby to have serious health problems. The results of this test will help your doctor know how to best care for you and your baby during your pregnancy.  How do you prepare for the test?  In general, there's nothing you have to do before this test, unless your doctor tells you " "to.  How is the test done?  A health professional uses a needle to take a blood sample, usually from the arm.  What happens after the test?  You will probably be able to go home right away. It depends on the reason for the test.  You can go back to your usual activities right away.  Follow-up care is a key part of your treatment and safety. Be sure to make and go to all appointments, and call your doctor if you are having problems. It's also a good idea to keep a list of the medicines you take. Ask your doctor when you can expect to have your test results.  Where can you learn more?  Go to https://www.[employer].net/patiented  Enter P722 in the search box to learn more about \"Rh Antibodies Screening During Pregnancy: About This Test.\"  Current as of: 2024  Content Version: 14.4    5644-8897 [employer].   Care instructions adapted under license by your healthcare professional. If you have questions about a medical condition or this instruction, always ask your healthcare professional. [employer] disclaims any warranty or liability for your use of this information.    Learning About Preventing Rh Disease  What is Rh disease?     Rh disease can be a serious problem in pregnancy. It happens when substances called antibodies in the mother's blood cause red blood cells in her baby's blood to be destroyed. This can occur when the blood types of a mother and her baby do not match.  All blood has an Rh factor. This is what makes a blood type positive or negative. When you are Rh-negative, your baby may be Rh-negative or Rh-positive. If your baby has Rh-positive blood and it mixes with yours, your body will make antibodies. This is called Rh sensitization.  Most of the time, this is not a problem in a first pregnancy. But in future pregnancies, it could cause Rh disease.  A  with Rh disease has mild anemia and may have jaundice. In severe cases, anemia, jaundice, and swelling can be " "very dangerous or fatal. Some babies need to be delivered early. Some need special care in the NICU. A very sick baby will need a blood transfusion before or after birth.  Fortunately, Rh sensitization is usually easy to prevent.  That's why it's important to get your Rh status checked in your first trimester. It doesn't cause any warning signs. A blood test is the only way to know if you are Rh-sensitive or are at risk for it.  How can you prevent Rh disease?  If you are Rh-negative, your doctor gives you an Rh immune globulin shot (such as RhoGAM). It helps prevent your body from making the antibodies that attack your baby's red blood cells.  Timing is important. You need the shot at certain times during your pregnancy. And you need one anytime there is a chance that your baby's blood might mix with yours. That can happen with certain prenatal tests or when you have pregnancy bleeding, such as:  Right after any pregnancy loss, amniocentesis, or CVS testing.  After turning of a breech baby.  Before and maybe after childbirth. Your doctor gives you a shot around week 28. If your  is Rh-positive, you will have another shot.  Follow-up care is a key part of your treatment and safety. Be sure to make and go to all appointments, and call your doctor if you are having problems. It's also a good idea to know your test results and keep a list of the medicines you take.  Where can you learn more?  Go to https://www.shopandsave.net/patiented  Enter W177 in the search box to learn more about \"Learning About Preventing Rh Disease.\"  Current as of: 2024  Content Version: 14.4    4215-9146 [employer].   Care instructions adapted under license by your healthcare professional. If you have questions about a medical condition or this instruction, always ask your healthcare professional. [employer] disclaims any warranty or liability for your use of this information.    Learning About Rh " Immunoglobulin Shots  Introduction     An Rh immunoglobulin shot is given to pregnant women who have Rh-negative blood.  You may have Rh-negative blood, and your baby may have Rh-positive blood. If the two types of blood mix, your body will make antibodies. This is called Rh sensitization. Most of the time, this is not a problem the first time you're pregnant. But it could cause problems in future pregnancies.  This shot keeps your body from making the antibodies. You get the shot around 28 weeks of pregnancy. After the birth, your baby's blood is tested. If the blood is Rh positive, you will get another shot. You may also get the shot if you have vaginal bleeding while you are pregnant or if you have a miscarriage. These shots protect future pregnancies.  Women with Rh negative blood will need this shot each time they get pregnant.  Example  Rh immunoglobulin (HypRho-D, MICRhoGAM, and RhoGAM)  Possible side effects  Rare side effects may include:  Some mild pain where you got the shot.  A slight fever.  An allergic reaction.  You may have other side effects not listed here. Check the information that comes with your medicine.  What to know about taking this medicine  You may need more than one shot. You may need the shot again:  After amniocentesis, fetal blood sampling, or chorionic villus sampling tests.  If you have bleeding in your second or third trimester.  After turning of a breech baby.  After an injury to the belly while you are pregnant.  After a miscarriage or an .  Before or right after treatment for an ectopic or a partial molar pregnancy.  Tell your doctor if you have any allergies or have had a bad response to medicines in the past.  If you get this shot within 3 months of getting a live-virus vaccine, the vaccine may not work. Your doctor will tell you if you need more vaccine.  Check with your doctor or pharmacist before you use any other medicines. This includes over-the-counter medicines.  "Make sure your doctor knows all of the medicines, vitamins, herbs, and supplements you take. Taking some medicines at the same time can cause problems.  Where can you learn more?  Go to https://www.[employer].net/patiented  Enter V615 in the search box to learn more about \"Learning About Rh Immunoglobulin Shots.\"  Current as of: April 30, 2024  Content Version: 14.4    5581-6667 [employer].   Care instructions adapted under license by your healthcare professional. If you have questions about a medical condition or this instruction, always ask your healthcare professional. [employer] disclaims any warranty or liability for your use of this information.    Rubella (Mexican Measles): Care Instructions  Overview  Rubella, also called Mexican measles or 3-day measles, is a disease caused by a virus. It spreads by coughs, sneezes, and close contact. Rubella usually is mild and does not cause long-term problems. But if you are pregnant and get it, you can give the disease to your unborn baby. This can cause serious birth defects.  While you have rubella, you may get a rash and a mild fever, and the lymph glands in your neck may swell. Older children often have a fever, eye pain, a sore throat, and body aches. You can relieve most symptoms with care at home. Avoid being around others, especially pregnant people, until your rash has been gone for at least 4 days. People who have not had this disease before or have not had the vaccine have the greatest chance of getting the virus.  Follow-up care is a key part of your treatment and safety. Be sure to make and go to all appointments, and call your doctor if you are having problems. It's also a good idea to know your test results and keep a list of the medicines you take.  How can you care for yourself at home?  Drink plenty of fluids. If you have kidney, heart, or liver disease and have to limit fluids, talk with your doctor before you increase the amount " "of fluids you drink.  Get plenty of rest to help your body heal.  Take an over-the-counter pain medicine, such as acetaminophen (Tylenol), ibuprofen (Advil, Motrin), or naproxen (Aleve), to reduce fever and discomfort. Read and follow all instructions on the label. Do not give aspirin to anyone younger than 20. It has been linked to Reye syndrome, a serious illness.  Do not take two or more pain medicines at the same time unless the doctor told you to. Many pain medicines have acetaminophen, which is Tylenol. Too much acetaminophen (Tylenol) can be harmful.  Try not to scratch the rash. Put cold, wet cloths on the rash to reduce itching.  Do not smoke. Smoking can make your symptoms worse. If you need help quitting, talk to your doctor about stop-smoking programs and medicines. These can increase your chances of quitting for good.  Avoid contact with people who have never had rubella and who have not been immunized.  When should you call for help?   Call your doctor now or seek immediate medical care if:    You have a fever with a stiff neck or a severe headache.     You are sensitive to light or feel very sleepy or confused.   Watch closely for changes in your health, and be sure to contact your doctor if:    You do not get better as expected.   Where can you learn more?  Go to https://www.Celect.net/patiented  Enter B812 in the search box to learn more about \"Rubella ([place] Measles): Care Instructions.\"  Current as of: April 30, 2024  Content Version: 14.4    1352-1339 [employer].   Care instructions adapted under license by your healthcare professional. If you have questions about a medical condition or this instruction, always ask your healthcare professional. [employer] disclaims any warranty or liability for your use of this information.    Gonorrhea and Chlamydia: About These Tests  What is it?  These tests use a sample of urine or other body fluid to look for the bacteria that " cause these sexually transmitted infections (STIs). The fluid sample can come from the cervix, vagina, rectum, throat, or eyes.  Why is this test done?  These tests may be done to:  Find out if symptoms are caused by gonorrhea or chlamydia.  Check people who are at high risk of being infected with gonorrhea or chlamydia.  Retest people several months after they have been treated for gonorrhea or chlamydia.  Check for infection in your  if you had a gonorrhea or chlamydia infection at the time of delivery.  How can you prepare for the test?  If you are going to have a urine test, do not urinate for at least 1 hour before the test.  If you think you may have chlamydia or gonorrhea, don't have sexual intercourse until you get your test results. And you may want to have tests for other STIs, such as HIV.  How is the test done?  For a direct sample, a swab is used to collect body fluid from the cervix, vagina, rectum, throat, or eyes. Your doctor may collect the sample. Or you may be given instructions on how to collect your own sample.  For a urine sample, you will collect the urine that comes out when you first start to urinate. Don't wipe the genital area clean before you urinate.  How long does the test take?  The test will take a few minutes.  What happens after the test?  You will be able to go home right away.  You can go back to your usual activities right away.  If you do have an infection, don't have sexual intercourse for 7 days after you start treatment. And your sex partner(s) should also be treated.  Follow-up care is a key part of your treatment and safety. Be sure to make and go to all appointments, and call your doctor if you are having problems. It's also a good idea to keep a list of the medicines you take. Ask your doctor when you can expect to have your test results.  Where can you learn more?  Go to https://www.healthwise.net/patiented  Enter K976 in the search box to learn more about  "\"Gonorrhea and Chlamydia: About These Tests.\"  Current as of: April 30, 2024  Content Version: 14.4    5756-6119 [employer].   Care instructions adapted under license by your healthcare professional. If you have questions about a medical condition or this instruction, always ask your healthcare professional. [employer] disclaims any warranty or liability for your use of this information.    Trichomoniasis: About This Test  What is it?     This test uses a sample of urine or other body fluid to look for the tiny parasite that causes trichomoniasis (also called trich). The fluid sample can come from the vagina, cervix, or urethra. Your doctor may choose to use one or more of many available tests.  Why is it done?  A trich test may be done to:  Find out if symptoms are caused by trich.  Check people who are at high risk for being infected with trich.  Check after treatment to make sure that the infection is gone.  How do you prepare for the test?  If you are going to have a urine test, do not urinate for at least 1 hour before the test.  How is the test done?  For a direct sample, a swab is used to collect body fluid from the cervix, vagina, or urethra. Your doctor may collect the sample. Or you may be given instructions on how to collect your own sample.  For a urine sample, you will collect the urine that comes out when you first start to urinate. Don't wipe the area clean before you urinate.  How long does the test take?  It will take a few minutes to collect a sample.  What happens after the test?  You can go home right away.  You can go back to your usual activities right away.  You may get the test results the same day or several days later. It depends on the test used.  If you do have an infection, don't have sexual intercourse for 7 days after you start treatment. Your sex partner or partners should also be treated.  Follow-up care is a key part of your treatment and safety. Be sure to " make and go to all appointments, and call your doctor if you are having problems. Ask your doctor when you can expect to have your test results.  Current as of: April 30, 2024  Content Version: 14.4    9966-4545 [employer].   Care instructions adapted under license by your healthcare professional. If you have questions about a medical condition or this instruction, always ask your healthcare professional. [employer] disclaims any warranty or liability for your use of this information.    HIV Testing: Care Instructions  Overview  You can get tested for the human immunodeficiency virus (HIV). Most doctors use a blood test to check for HIV antibodies and antigens in your blood. It may also check for the genetic material (RNA) of HIV. Some tests use saliva to check for HIV antibodies. But these aren't as accurate. For example, they may give a false result if you've just been infected.  What do the results mean?        Normal (negative)   No HIV antibodies, antigens, or RNA were found.  You may need more testing. It can make sure your test results are correct.        Uncertain (indeterminate)   Test results didn't clearly show if you have an HIV infection.  HIV antibodies or antigens may not have formed yet.  Some other type of antibody or antigen may have affected the results.  You will need another test to be sure.        Abnormal (positive)   HIV antibodies, antigens, or RNA were found.  If you haven't had an RNA test yet, one will be done. If it's positive, you have HIV.  If your test result is positive, your doctor will talk to you. You will discuss starting treatment.  Follow-up care is a key part of your treatment and safety. Be sure to make and go to all appointments, and call your doctor if you are having problems. It's also a good idea to know your test results and keep a list of the medicines you take.  Where can you learn more?  Go to https://www.[email or web address].net/patiented  Enter T792 in  "the search box to learn more about \"HIV Testing: Care Instructions.\"  Current as of: April 30, 2024  Content Version: 14.4    9932-9906 [email or web address].   Care instructions adapted under license by your healthcare professional. If you have questions about a medical condition or this instruction, always ask your healthcare professional. [email or web address] disclaims any warranty or liability for your use of this information.    Hepatitis C Virus Tests: About These Tests  What are they?     Hepatitis C virus tests are blood tests that check for substances in the blood that show whether you have hepatitis C now or had it in the past. The tests can also tell you what type of hepatitis C you have and how severe the disease is. This can help your doctor with treatment.  If the tests show that you have long-term hepatitis C, you need to take steps to prevent spreading the disease.  Why are these tests done?  You may need these tests if:  You have symptoms of hepatitis.  You may have been exposed to the virus. You are more likely to have been exposed to the virus if you inject drugs or are exposed to body fluids (such as if you are a health care worker).  You've had other tests that show you have liver problems.  You are 18 to 79 years old.  You have an HIV infection.  The tests also are done to help your doctor decide about your treatment and see how well it works.  How do you prepare for the test?  In general, there's nothing you have to do before this test, unless your doctor tells you to.  How is the test done?  A health professional uses a needle to take a blood sample, usually from the arm.  What happens after these tests?  You will probably be able to go home right away.  You can go back to your usual activities right away.  Follow-up care is a key part of your treatment and safety. Be sure to make and go to all appointments, and call your doctor if you are having problems. It's also a good idea to keep a " "list of the medicines you take. Ask your doctor when you can expect to have your test results.  Where can you learn more?  Go to https://www.[employer].net/patiented  Enter W551 in the search box to learn more about \"Hepatitis C Virus Tests: About These Tests.\"  Current as of: April 30, 2024  Content Version: 14.4    [id number] [employer].   Care instructions adapted under license by your healthcare professional. If you have questions about a medical condition or this instruction, always ask your healthcare professional. [employer] disclaims any warranty or liability for your use of this information.    Learning About Fetal Ultrasound Results  What is a fetal ultrasound?     Fetal ultrasound is a test that lets your doctor see an image of your baby. Your doctor learns information about your baby from this picture. You may find out, for example, if you are having a boy or a girl. But the main reason you have this test is to get information about your baby's health.  (You may hear your baby called a fetus. This is a common medical term for a baby that's growing in the mother's uterus.)  What kind of information can you learn from this test?  The findings of an ultrasound fall into two categories, normal and abnormal.  Normal  The fetus is the right size for its age.  The placenta is the expected size and does not cover the cervix.  There is enough amniotic fluid in the uterus.  No birth defects can be seen.  Abnormal  The fetus is small or large for its age.  The placenta covers the cervix.  There is too much or too little amniotic fluid in the uterus.  The fetus may have a birth defect.  What does an abnormal result mean?  Abnormal seems to imply that something is wrong with your baby. But what it means is that the test has shown something the doctor wants to take a closer look at.  And that's what happens next. Your doctor will talk to you about what further test or tests you may need.  What " do the results mean?  Some of the things your doctor may see on an abnormal ultrasound include:  Echogenic bowel.  The bowel looks very bright on the screen. This could mean that there's blood in the bowel. Or it could mean that something is blocking the small bowel.  Increased nuchal translucency.  The ultrasound measures the thickness at the back of the baby's neck. An increase in thickness is sometimes an early sign of Down syndrome.  Increased or decreased amniotic fluid.  The doctor will look for a reason for the level of amniotic fluid and will watch the pregnancy closely as it progresses.  Large ventricles.  Ventricles in the brain look larger than they should. Your doctor may take a closer look at the brain.  Renal pyelectasis/hydronephrosis.  The ultrasound measures the fluid around the kidney. If there is more fluid than expected, there is a chance of urinary tract or kidney problems.  Short long bones.  The ultrasound measures certain arm and leg bones. A long bone (humerus or femur) that is shorter than average could be a sign of Down syndrome.  Subchorionic hemorrhage.  An ultrasound can show bleeding under one of the membranes that surrounds the fetus. Some women don't have symptoms of bleeding. The ultrasound can find this problem when women are not bleeding from their vagina. Women who have this condition have a slightly higher chance of miscarriage.  What do you do now?  Take a deep breath, and let it out. Keep in mind that an abnormal finding on an ultrasound, after it's coupled with more information, may:  Turn out to be nothing.  Turn out to be something mild that won't affect the baby.  Turn out to be something more serious. But if this happens, early diagnosis helps you and your doctor plan treatment options sooner rather than later.  Your medical team is there for you. So are your family and friends. Ask questions, and get the help and support you need.  Follow-up care is a key part of your  "treatment and safety. Be sure to make and go to all appointments, and call your doctor if you are having problems. It's also a good idea to know your test results and keep a list of the medicines you take.  Where can you learn more?  Go to https://www.[employer].net/patiented  Enter K451 in the search box to learn more about \"Learning About Fetal Ultrasound Results.\"  Current as of: April 30, 2024  Content Version: 14.4    1044-2599 [employer].   Care instructions adapted under license by your healthcare professional. If you have questions about a medical condition or this instruction, always ask your healthcare professional. [employer] disclaims any warranty or liability for your use of this information.    Learning About Prenatal Visits  Overview     Regular prenatal visits are very important during any pregnancy. These quick office visits may seem simple and routine. But they can help you have a safe and healthy pregnancy. Your doctor is watching for problems that can only be found through regular checkups. The visits also give you and your doctor time to build a good relationship.  After your first visit, you will most likely start on a schedule of monthly visits. In your third trimester, the visits will get more frequent. Based on your health, your age, and if you've had a normal, full-term pregnancy before, your doctor may want to see you more or less often.  At different times in your pregnancy, you will have exams and tests. Some are routine. Others are done only when there is a chance of a problem. Everything healthy you do for your body helps you have a healthy pregnancy. Rest when you need it. Eat well, drink plenty of water, and exercise regularly.  What happens during a prenatal visit?  You will have blood pressure checks, along with urine tests. You also may have blood tests. If you need to go to the bathroom while waiting for the doctor, tell the nurse. You will be given a " sample cup so your urine can be tested.  You will be weighed and have your belly measured.  Your doctor may listen to the fetal heartbeat with a special device.  At about 24 weeks, and possibly earlier in your pregnancy, your doctor will check your blood sugar (glucose tolerance test) for diabetes that can occur during pregnancy. This is gestational diabetes, which can be harmful.  You will have tests to check for infections that could harm your . These include group B streptococcus and hepatitis B.  Your doctor may do ultrasounds to check for problems. This also checks the position of the fetus. An ultrasound uses sound waves to produce a picture of the fetus.  You may get your vaccines updated.  Your doctor may ask you questions to check for signs of anxiety or depression. Tell your doctor if you feel sad, anxious, or hopeless for more than a few days.  You may have other tests at any time during your pregnancy.  Use your visits to discuss with your doctor any concerns you have.  How can you care for yourself at home?  Get plenty of rest.  Try to exercise every day, if your doctor says it is okay. If you have not exercised in the past, start out slowly. For example, you can take short walks each day.  Choose healthy foods, such as fruits, vegetables, whole grains, lean proteins, low-fat dairy, and healthy fats.  Drink plenty of fluids. Cut down on drinks with caffeine, such as coffee, tea, and cola. If you have kidney, heart, or liver disease and have to limit fluids, talk with your doctor before you increase the amount of fluids you drink.  Try to avoid chemical fumes, paint fumes, and poisons.  If you smoke, vape, or use alcohol, marijuana, or other drugs, quit or cut back as much as you can. Talk to your doctor if you need help quitting.  Review all of your medicines, including over-the-counter medicines and supplements, with your doctor. Some of your routine medicines may need to be changed. Do not stop  "or start taking any medicines without talking to your doctor first.  Follow-up care is a key part of your treatment and safety. Be sure to make and go to all appointments, and call your doctor if you are having problems. It's also a good idea to know your test results and keep a list of the medicines you take.  Where can you learn more?  Go to https://www.Crossfader.net/patiented  Enter J502 in the search box to learn more about \"Learning About Prenatal Visits.\"  Current as of: April 30, 2024  Content Version: 14.4    6741-5754 [employer].   Care instructions adapted under license by your healthcare professional. If you have questions about a medical condition or this instruction, always ask your healthcare professional. [employer] disclaims any warranty or liability for your use of this information.    Intimate Partner Violence: Care Instructions  Overview     If you want to save this information but don't think it is safe to take it home, see if a trusted friend can keep it for you. Plan ahead. Know who you can call for help, and memorize the phone number.   Be careful online too. Your online activity may be seen by others. Do not use your personal computer or device to read about this topic. Use a safe computer, such as one at work, a friend's home, or a library.    Intimate partner violence--a type of domestic abuse--is different from an argument now and then. It is a pattern of abuse that one person may use to control another person's behavior. It may start with threats and name-calling. Then, it may lead to more serious acts, like pushing and slapping. The abuse also may occur in other areas. For example, the abuser may withhold money or spend a partner's money without their knowledge.  Abuse can cause serious harm. You are more likely to have a long-term health problem from the injuries and stress of living in a violent relationship. People who are sexually abused by their partners " have more sexually transmitted infections and unplanned pregnancies. Anyone who is abused also faces emotional pain. Anyone can be abused in relationships. In some relationships, both people use abusive behavior.  If you are pregnant, abuse can cause problems such as poor weight gain, infections, and bleeding. Abuse during this time may increase your baby's risk of low birth weight, premature birth, and death.  Follow-up care is a key part of your treatment and safety. Be sure to make and go to all appointments, and call your doctor if you are having problems. It's also a good idea to know your test results and keep a list of the medicines you take.  How can you care for yourself at home?  If you do not have a safe place to stay, discuss this with your doctor before you leave.  Have a plan for where to go, how to leave your home, and where to stay in case of an emergency. Do not tell your partner about your plan. Contact:  The National Domestic Violence Hotline toll-free at 1-660.798.1714. They can help you find resources in your area.  Your local police department, hospital, or clinic for information about shelters and safe homes near you.  Talk to a trusted friend or neighbor, a counselor, or a khadijah leader. Do not feel that you have to hide what happened.  Teach your children how to call for help in an emergency.  Be alert to warning signs, such as threats, heavy alcohol use, or drug use. This can help you avoid danger.  If you can, make sure that there are no guns or other weapons in your home.  When should you call for help?   Call 911 anytime you think you may need emergency care. For example, call if:    You or someone else has just been abused.     You think you or someone else is in danger of being abused.   Watch closely for changes in your health, and be sure to contact your doctor if you have any problems.  Where can you learn more?  Go to https://www.healthwise.net/patiented  Enter G282 in the search  "box to learn more about \"Intimate Partner Violence: Care Instructions.\"  Current as of: July 31, 2024  Content Version: 14.4    2777-9174 [employer].   Care instructions adapted under license by your healthcare professional. If you have questions about a medical condition or this instruction, always ask your healthcare professional. [employer] disclaims any warranty or liability for your use of this information.    Intimate Partner Violence Safety Instructions: Care Instructions  Overview     If you want to save this information but don't think it is safe to take it home, see if a trusted friend can keep it for you. Plan ahead. Know who you can call for help, and memorize the phone number.   Be careful online too. Your online activity may be seen by others. Do not use your personal computer or device to read about this topic. Use a safe computer, such as one at work, a friend's home, or a library.    When you are abused by a spouse or partner, you can take actions to protect yourself and your children.  You can increase your safety whether you decide to stay with your spouse or partner or you decide to leave. You may want to make a safety plan and pack a bag ahead of time. This will help you leave quickly when you decide to. Remember, you cannot change your partner's actions, but you can find help for you and your children. No one deserves to be abused.  Follow-up care is a key part of your treatment and safety. Be sure to make and go to all appointments, and call your doctor if you are having problems. It's also a good idea to know your test results and keep a list of the medicines you take.  How can you care for yourself at home?  Make a plan for your safety   If you decide to stay with your abusive spouse or partner, you can do the following to increase your safety:  Decide what works best to keep you safe in an emergency.  Know who you can call to help you in an emergency.  Decide if you " will call the police if you get hurt again. If you can, agree on a signal with your children or neighbor to call the police for you if you need help. You can flash lights or hang something out of a window.  Choose a safe place to go for a short time if you need to leave home. Memorize the address and phone number.  Learn escape routes out of your home in case you need to leave in a hurry. Teach your children different ways to get out of your home quickly if they need to.  If you can, hide or lock up things that can be used as weapons (guns, knives, hammers).  Learn the number of a domestic violence shelter. Talk to the people there about how they can help.  Find out about other community resources that can help you.  Take pictures of bruises or other injuries if you can. You can also take pictures of things your abuser has broken.  Teach your children that violence is never okay. Tell them that they do not deserve to be hurt.  Pack a bag   Prepare a bag with things you will need if you leave suddenly. Leave it with a friend, a relative, or someone else you trust. You could include the following items in the bag:  A set of keys to your home and car.  Emergency phone numbers and addresses.  Money such as cash or checks. You can also ask a friend, a relative, or someone else you trust to hold money for you.  Copies of legal documents such as house and car titles or rent receipts, birth certificates, Social Security card, voter registration, marriage and 's licenses, and your children's health records.  Personal items you would need for a few days, such as clothes, a toothbrush, toothpaste, and any medicines you or your children need.  A favorite toy or book for your child or children.  Diapers and bottles, if you have very young children.  Pictures that show signs of abuse and violence. You may also add pictures of your abuser.  If you leave   If you decide to leave, you can take the following steps:  Go to the  "emergency room at a hospital if you have been hurt.  Think about asking the police to be with you as you leave. They can protect you as you leave your home.  If you decide to leave secretly, remember that activities can be tracked. Your abuser may still have access to your cell phone, email, and credit cards. It may be possible for these to be traced. Always be aware of your surroundings.  If this is an emergency, do not worry about gathering up anything. Just leave--your safety is most important.  If your abuser moves out, change the locks on the doors. If you have a security system, change the access code.  When should you call for help?   Call 911 anytime you think you may need emergency care. For example, call if:    You or someone else has just been abused.     You think you or someone else is in danger of being abused.   Watch closely for changes in your health, and be sure to contact your doctor if you have any problems.  Where can you learn more?  Go to https://www.vidIQ.net/patiented  Enter A752 in the search box to learn more about \"Intimate Partner Violence Safety Instructions: Care Instructions.\"  Current as of: July 31, 2024  Content Version: 14.4    7752-0467 [employer].   Care instructions adapted under license by your healthcare professional. If you have questions about a medical condition or this instruction, always ask your healthcare professional. [employer] disclaims any warranty or liability for your use of this information.    Learning About Intimate Partner Violence  What is intimate partner violence?  Intimate partner violence is a type of domestic abuse. It's threatening, emotionally harmful, or violent behavior in a personal relationship. It can happen between past or current partners or spouses. In some relationships both people abuse each other. One partner may be more abusive. Or the abuse may be equal.  Abuse can affect people of any ethnic group, race, or " Mandaeism. It can affect teens, adults, or the elderly. And it can happen to people of any sexual orientation, gender, or social status.  Abusers use fear, bullying, and threats to control their partners. They may control what their partners do. They may control where their partners go or who they see. They may act jealous, controlling, or possessive. These early signs of abuse may happen soon after the start of the relationship. Sometimes it can be hard to notice abuse at first. But after the relationship becomes more serious, the abuse may get worse.  If you are being abused in your relationship, it's important to get help. The abuse is not your fault. You don't have to face it alone.  Be careful  It may not be safe to take home domestic abuse information like this handout. Some people ask a trusted friend to keep it for them. It's also important to plan ahead and to memorize the phone number of places you can go for help. If you are concerned about your safety, do not use your computer, smartphone, or tablet to read about domestic abuse.   What are the types of intimate partner violence?  Abuse can happen in different ways. Each type can happen on its own or in combination with others.  Emotional abuse  Emotional abuse is a pattern of threats, insults, or controlling behavior. It includes verbal abuse. It goes beyond healthy disagreements in a relationship. It's a sign of an unhealthy relationship.  Do you feel threatened, intimidated, or controlled?  Does your partner:  Threaten your children, other family members, or pets?  Use jokes meant to embarrass or shame you?  Call you names?  Tell you that you are a bad parent?  Threaten to take away your children?  Threaten to have you or your family members deported?  Control your access to money or other basic needs?  Control what you do, who you see or talk to, or where you go?  Another form of emotional abuse is denying that it is happening. Or the abuser may act  like the abuse is no big deal or is your fault.  Sexual abuse  With sexual abuse, abusers may try to convince or force you to have sex. They may force you into sex acts you're not comfortable with. Or they may sexually assault you. Sexual abuse can happen even if you are in a committed relationship.  Physical abuse  Physical abuse means that a partner hits, kicks, or does something else to physically hurt you. Physical abuse that starts with a slap might lead to kicking, shoving, and choking over time. The abuser may also threaten to hurt or kill you.  Stalking  Stalking means that an abuser gives you attention that you do not want and that causes you fear. Examples of stalking include:  Following you.  Showing up at places where the abuser isn't invited, such as at your work or school.  Constantly calling or texting you.  What problems can  to?  Intimate partner violence can be very dangerous. It can cause serious, repeated injury. It can even lead to death.  All forms of abuse can cause long-term health problems from the stress of a violent relationship. Verbal abuse can lead to sexual and physical abuse.  Abuse causes:  Emotional pain.  Depression.  Anxiety.  Post-traumatic stress.  Sexual abuse can lead to sexually transmitted infections (such as HIV/AIDS) and unplanned pregnancy.  Pregnancy can be a very dangerous time for people in abusive relationships. Abuse can cause or increase the risk of problems during pregnancy. These include low weight gain, anemia, infections, and bleeding. Abuse may also increase your baby's risk of low birth weight, premature birth, and death.  It can be hard for some victims of abuse to ask for help or to leave their relationship. You may feel scared, stuck, or not sure what steps to take. But it's important not to ignore abuse. Talking to someone you trust could be the first step to ending the abuse and taking care of your own health and happiness again. There are  "resources available that can help keep you safe.  Where can you get help?  Talk to a trusted friend. Find a local advocacy group, or talk to your doctor about the abuse.  Contact the National Domestic Violence Hotline at 2-074-964-RNOX (1-471.929.2718) for more safety tips. They can guide you to groups in your area that can help. Or go to the National Coalition Against Domestic Violence website at www.theTechnoSpin.org to learn more.  Domestic violence groups or a counselor in your area can help you make a safety plan for yourself and your children.  When to call for help  Call 911 anytime you think you may need emergency care. For example, call if:  You think that you or someone you know is in danger of being abused.  You have been hurt and can't have someone safely take you to emergency care.  You have just been abused.  A family member has just been abused.  Where can you learn more?  Go to https://www.PandaDoc.net/patiented  Enter S665 in the search box to learn more about \"Learning About Intimate Partner Violence.\"  Current as of: July 31, 2024  Content Version: 14.4    3541-4442 [employer].   Care instructions adapted under license by your healthcare professional. If you have questions about a medical condition or this instruction, always ask your healthcare professional. [employer] disclaims any warranty or liability for your use of this information.    Vaginal Bleeding During Pregnancy: Care Instructions  Overview     It's common to have some vaginal spotting when you are pregnant. In some cases, the bleeding isn't serious. And there aren't any more problems with the pregnancy.  But sometimes bleeding is a sign of a more serious problem. This is more common if the bleeding is heavy or painful. Examples of more serious problems include miscarriage, an ectopic pregnancy, and a problem with the placenta.  You may have to see your doctor again to be sure everything is okay. You may also " need more tests to find the cause of the bleeding.  Home treatment may be all you need. But it depends on what is causing the bleeding. Be sure to tell your doctor if you have any new symptoms or if your symptoms get worse.  The doctor has checked you carefully, but problems can develop later. If you notice any problems or new symptoms, get medical treatment right away.  Follow-up care is a key part of your treatment and safety. Be sure to make and go to all appointments, and call your doctor if you are having problems. It's also a good idea to know your test results and keep a list of the medicines you take.  How can you care for yourself at home?  If your doctor prescribed medicines, take them exactly as directed. Call your doctor if you think you are having a problem with your medicine.  Do not have vaginal sex until your doctor says it's okay.  Do not put anything in your vagina until your doctor says it's okay.  Ask your doctor about other activities you can or can't do.  Get a lot of rest. Being pregnant can make you tired.  Do not use nonsteroidal anti-inflammatory drugs (NSAIDs), such as ibuprofen (Advil, Motrin), naproxen (Aleve), or aspirin, unless your doctor says it is okay.  When should you call for help?   Call 911 anytime you think you may need emergency care. For example, call if:    You passed out (lost consciousness).     You have severe vaginal bleeding. This means you are soaking through a pad each hour for 2 or more hours.     You have sudden, severe pain in your belly or pelvis.   Call your doctor now or seek immediate medical care if:    You have new or worse vaginal bleeding.     You are dizzy or lightheaded, or you feel like you may faint.     You have pain in your belly, pelvis, or lower back.     You think that you are in labor.     You have a sudden release of fluid from your vagina.     You've been having regular contractions for an hour. This means that you've had at least 8  contractions within 1 hour or at least 4 contractions within 20 minutes, even after you change your position and drink fluids.     You notice that your baby has stopped moving or is moving much less than normal.   Watch closely for changes in your health, and be sure to contact your doctor if you have any problems.  Current as of: April 30, 2024  Content Version: 14.4    4449-8986 [employer].   Care instructions adapted under license by your healthcare professional. If you have questions about a medical condition or this instruction, always ask your healthcare professional. [employer] disclaims any warranty or liability for your use of this information.                                                       If you have any questions regarding your visit, Please contact your care team.     [employer] [facility]: [phone]    To Schedule an Appointment 24/7  Call: [phone]LEPHONE [facility]     Iglesia Sifuentes MD  Medical Director        Aisha-HIRO Lopez-HIRO Jones-Surgery Scheduler  Ariana-Surgery Scheduler               Tuesday-[place]  7:30 a.m-4:30 p.m    Thursday-[place]  7:30 a.m-4:30 p.m    Typical Surgery Days: Tuesday or Friday [facility]  [address] [facility] [place] [address]  PH: [phone]    Imaging Scheduling all locations  PH: [phone]     [facility] Labor and Delivery  [address] [place] [address] [place] [address]  PH: [phone]    [facility]  [address] [place] [address]  PH: [phone] Fax      **Surgeries** Our Surgery Schedulers will contact you to schedule. If you do not receive a call within 3 business days, please call [phone].    Urgent Care locations:  [facility] Monday-Friday  10 am - 8 pm  Saturday and Sunday   9 am - 5 pm  Monday-Friday   10 am - 8 pm  Saturday and Sunday   9 am - 5 pm   [phone]   If you need a medication refill, please contact your pharmacy. Please allow 3 business days for your refill to be completed.  As always, Thank you for trusting us with your healthcare needs!    see additional instructions from your care team below

## 2025-06-05 LAB — BACTERIA UR CULT: NORMAL

## 2025-06-19 ENCOUNTER — LAB (OUTPATIENT)
Dept: LAB | Facility: CLINIC | Age: 25
End: 2025-06-19
Payer: COMMERCIAL

## 2025-06-19 DIAGNOSIS — O34.219 PREVIOUS CESAREAN DELIVERY, ANTEPARTUM CONDITION OR COMPLICATION: ICD-10-CM

## 2025-06-19 DIAGNOSIS — Z81.8 FAMILY HISTORY OF AUTISTIC DISORDER: ICD-10-CM

## 2025-06-25 LAB — SCANNED LAB RESULT: NORMAL

## 2025-06-26 NOTE — PATIENT INSTRUCTIONS
If you wish to schedule another appointment, please call our office at [phone]. You can also make appointments through [email or web address]  Return to clinic:     Every 4 weeks till 28 weeks, then every 2 weeks till 36 weeks, then weekly till delivery    If anything comes up between your visits or you have concerns, please don't hesitate to contact us.                                               If you have labs or imaging done, the results will automatically release in [email or web address] without an interpretation.  Your health care professional will review those results and send an interpretation with recommendations as soon as possible, but this may be 1-3 business days.    If you have any questions regarding your visit, please contact your care team.     [employer] [facility]: [phone]  [facility] CLINIC HOURS TELEPHONE NUMBER   Alka Padron     Myron -Surgery Scheduler  Ariana -     HIRO Lopez, HIRO Leonard RN   [place]    Monday 8:30 am-5:00 pm  Wednesday 8:30 am-5:00 pm  Friday 8:30 am-5:00 pm    Typical Surgery day: Tuesday [facility]  [address] [place] [address]  Phone:  [phone]  Fax: [phone]   Appointment [phone]    Imaging Scheduling-All Locations [phone]    [facility] Labor and Delivery  [address] [place] [address] [place] [address] [phone]   **Surgeries** Our Surgery Schedulers will contact you to schedule. If you do not receive a call within 3 business days, please call [phone].  Urgent Care locations:  [facility] Monday-Friday   10 am - 8 pm  Saturday and    9 am - 5 pm [phone]   If you need a medication refill, please contact your pharmacy. Please allow 3 business days for your refill to be completed.  As always, Thank you for trusting us with your healthcare needs!  see additional instructions from your care team below

## 2025-06-30 ENCOUNTER — PRENATAL OFFICE VISIT (OUTPATIENT)
Dept: OBGYN | Facility: CLINIC | Age: 25
End: 2025-06-30
Payer: COMMERCIAL

## 2025-06-30 VITALS
WEIGHT: 165 LBS | DIASTOLIC BLOOD PRESSURE: 79 MMHG | BODY MASS INDEX: 27.73 KG/M2 | OXYGEN SATURATION: 99 % | SYSTOLIC BLOOD PRESSURE: 120 MMHG | HEART RATE: 76 BPM

## 2025-06-30 DIAGNOSIS — O36.8390 ULTRASOUND SCAN DONE FOR INABILITY TO HEAR FETAL HEART TONES: ICD-10-CM

## 2025-06-30 DIAGNOSIS — L72.9 SKIN CYST: ICD-10-CM

## 2025-06-30 DIAGNOSIS — Z34.81 ENCOUNTER FOR SUPERVISION OF OTHER NORMAL PREGNANCY, FIRST TRIMESTER: Primary | ICD-10-CM

## 2025-06-30 PROCEDURE — 99207 PR PRENATAL VISIT: CPT | Performed by: OBSTETRICS & GYNECOLOGY

## 2025-06-30 PROCEDURE — 99213 OFFICE O/P EST LOW 20 MIN: CPT | Mod: 25 | Performed by: OBSTETRICS & GYNECOLOGY

## 2025-06-30 NOTE — PROGRESS NOTES
She has not felt fetal movement yet but will record when this begins.  She lost 2 lbs since her last visit but declines a script for an antiemetic medication since her nausea is improving.  She understands that she will need rhogam later on during pregnancy since her blood type is O Neg.  She is planning to have a TOLAC and understands that her Prequel result was normal on 6/19/2025.  She would like a referral to Derm due to a painful cyst on her nose and change in skin lesion on her right cheek.  Will order a repeat OB US due to the inability to hear fhts today so she is very nervous.  She plans to also get a 20-week OB US but it is too early to place this order today.

## 2025-07-01 ENCOUNTER — PATIENT OUTREACH (OUTPATIENT)
Dept: CARE COORDINATION | Facility: CLINIC | Age: 25
End: 2025-07-01
Payer: COMMERCIAL

## 2025-07-02 ENCOUNTER — RESULTS FOLLOW-UP (OUTPATIENT)
Dept: OBGYN | Facility: CLINIC | Age: 25
End: 2025-07-02

## 2025-07-02 ENCOUNTER — ANCILLARY PROCEDURE (OUTPATIENT)
Dept: ULTRASOUND IMAGING | Facility: CLINIC | Age: 25
End: 2025-07-02
Attending: OBSTETRICS & GYNECOLOGY
Payer: COMMERCIAL

## 2025-07-02 DIAGNOSIS — O36.8390 ULTRASOUND SCAN DONE FOR INABILITY TO HEAR FETAL HEART TONES: ICD-10-CM

## 2025-07-02 PROCEDURE — 76817 TRANSVAGINAL US OBSTETRIC: CPT

## 2025-07-02 PROCEDURE — 76801 OB US < 14 WKS SINGLE FETUS: CPT

## 2025-07-03 ENCOUNTER — PATIENT OUTREACH (OUTPATIENT)
Dept: CARE COORDINATION | Facility: CLINIC | Age: 25
End: 2025-07-03
Payer: COMMERCIAL

## 2025-07-29 NOTE — PATIENT INSTRUCTIONS
If you have labs or imaging done, the results will automatically release in [employer] without an interpretation.  Your health care professional will review those results and send an interpretation with recommendations as soon as possible, but this may be 1-3 business days.    If you have any questions regarding your visit, please contact your care team.     [employer] [facility]: [phone]  Women s Health CLINIC HOURS TELEPHONE NUMBER   Kit Sheriff DO.    Adelina Leonard-RN  Jessica-RN  Erna Sanches-Surgery Scheduler  Ariana-         Monday-[place]  8:00 am-4:00 pm  Tuesday-[place]  8:00 am-4:00 pm  Wednesday-[place] 8:00 am-12:00 pm  Thursday-[place] 8:00 am-4:00 pm    Typical Surgery Day [facility]  [address] [place] [address]  PH: [phone] Fax    Imaging Scheduling all locations  [phone] ([facility] Labor and Delivery  [address] [place] [address]  [phone]    [facility]  [address] [address]  PH:[phone]    [facility]  [address] [place] [address]  PH: [phone]     **Surgeries** Our Surgery Schedulers will contact you to schedule. If you do not receive a call within 3 business days, please call [phone].  Urgent Care locations:  [facility]       Monday-Friday   10 am - 8 pm  Saturday and Sunday   9 am - 5 pm   [phone]   If you need a medication refill, please contact your pharmacy. Please allow 3 business days for your refill to be completed.  As always, Thank you for trusting us with your healthcare needs!  You have been provided the CDC Warning Signs in Pregnancy document.    Additional copies can be found here: [email or web address].pdf  Weeks 14 to 18 of Your Pregnancy: Care Instructions  Around this time,  you may start to look pregnant. Your baby is now able to pass urine. And the first stool (meconium) is starting to collect in your baby's intestines. Hair is starting to grow on your baby's head.    You may notice some skin changes, such as itchy spots on your palms or acne on your face.   At your next doctor visit, you may have an ultrasound. So you might think about whether you want to know the sex of your baby. Also ask your doctor about flu and COVID-19 shots.   Tips for weeks 14 to 18 of pregnancy  How to reduce stress       Ask for help when you need it.  Try to avoid things that cause you stress.  Seek out things that relieve stress, such as breathing exercises or yoga.  How to get exercise       If you don't usually exercise, start slowly. Short walks may be a good choice.  Try to be active 30 minutes a day, at least 5 days a week.  Avoid activities where you're more likely to fall.  Use light weights to reduce stress on your joints.  How to stay at a healthy weight for you       Talk to your doctor or midwife about how much weight you should gain.  It's generally best to gain:  About 28 to 40 pounds if you're underweight.  About 25 to 35 pounds if you're at a healthy weight.  About 15 to 25 pounds if you're overweight.  About 11 to 20 pounds if you're very overweight (obese).  Follow-up care is a key part of your treatment and safety. Be sure to make and go to all appointments, and call your doctor if you are having problems. It's also a good idea to know your test results and keep a list of the medicines you take.  When should you call for help?  Call 911  anytime you think you may need emergency care. For example, call if:  You have severe vaginal bleeding. You have soaked through one or more pads in an hour, and the bleeding is not slowing down.  You have chest pain, are short of breath, or cough up blood.  You have sudden, severe pain in your belly that does not go away.  You passed out (lost  "consciousness).  Call your doctor or midwife now or seek immediate medical care if:  You have a fever.  You have vaginal bleeding.  You are dizzy or lightheaded, or you feel like you may faint.  You have signs of a blood clot in your leg (called a deep vein thrombosis), such as:  Pain in the calf, back of the knee, thigh, or groin.  Swelling in your leg or groin.  A color change on the leg or groin. The skin may be reddish or purplish.  You have symptoms of a urinary tract infection. These may include:  Pain or burning when you urinate.  A frequent need to urinate without being able to pass much urine.  Pain in your low back (below the rib cage and above the waist).  Blood in your urine.  You have belly pain.  You think you are having contractions.  Watch closely for changes in your health, and be sure to contact your doctor or midwife if:  You have vaginal discharge that smells bad.  You feel sad, anxious, or hopeless for more than a few days.  You have other concerns about your pregnancy.  Where can you learn more?  Go to https://www.FashionGuide.net/patiented  Enter I453 in the search box to learn more about \"Weeks 14 to 18 of Your Pregnancy: Care Instructions.\"  Current as of: April 30, 2024  Content Version: 14.5 2024-2025 [employer].   Care instructions adapted under license by your healthcare professional. If you have questions about a medical condition or this instruction, always ask your healthcare professional. [employer] disclaims any warranty or liability for your use of this information.    "

## 2025-07-31 ENCOUNTER — PRENATAL OFFICE VISIT (OUTPATIENT)
Dept: OBGYN | Facility: CLINIC | Age: 25
End: 2025-07-31
Payer: COMMERCIAL

## 2025-07-31 VITALS
SYSTOLIC BLOOD PRESSURE: 114 MMHG | BODY MASS INDEX: 28.18 KG/M2 | WEIGHT: 167.7 LBS | DIASTOLIC BLOOD PRESSURE: 77 MMHG | HEART RATE: 109 BPM

## 2025-07-31 DIAGNOSIS — O34.219 PREGNANCY WITH HISTORY OF CESAREAN SECTION, ANTEPARTUM: Primary | ICD-10-CM

## 2025-07-31 NOTE — PROGRESS NOTES
Mariya Mehta is a 25 year old  at 15w6d presenting for routine prenatal care. She is doing well with no concerns. Denies loss of fluid, vaginal bleeding, pelvic cramping.      Objective:  /77   Pulse 109   Wt 76.1 kg (167 lb 11.2 oz)   LMP 2025   BMI 28.18 kg/m    WDWN, NAD  Non-labored breathing  Abdomen soft, nttp  FH: consistent with dates   FHT: 150      A/P: Mariya Mehta is a 25 year old  at 15w6d presenting for routine ob visit. No new concerning findings on history or exam.  Fetal status is reassuring today.      ICD-10-CM    1. Pregnancy with history of  section, antepartum  O34.219           -New OB labs reviewed, wnl   -Rh negative: will need antibiotics in labor  -Genetic screening: NIPT normal  -Recommend daily prenatal vitamin  -Anatomy scan ordered  -Follow up in 4 weeks for routine OB visit  -Return precautions reviewed    Kit Sheriff DO, FACOG  Gynecologic Surgeon and Obstetrician

## 2025-08-26 ENCOUNTER — PRENATAL OFFICE VISIT (OUTPATIENT)
Dept: OBGYN | Facility: CLINIC | Age: 25
End: 2025-08-26
Payer: COMMERCIAL

## 2025-08-26 ENCOUNTER — ANCILLARY PROCEDURE (OUTPATIENT)
Dept: ULTRASOUND IMAGING | Facility: CLINIC | Age: 25
End: 2025-08-26
Attending: GENERAL PRACTICE
Payer: COMMERCIAL

## 2025-08-26 VITALS
WEIGHT: 171.9 LBS | OXYGEN SATURATION: 95 % | DIASTOLIC BLOOD PRESSURE: 67 MMHG | BODY MASS INDEX: 28.88 KG/M2 | HEART RATE: 116 BPM | SYSTOLIC BLOOD PRESSURE: 120 MMHG

## 2025-08-26 DIAGNOSIS — O34.219 PREGNANCY WITH HISTORY OF CESAREAN SECTION, ANTEPARTUM: Primary | ICD-10-CM

## 2025-08-26 DIAGNOSIS — O34.219 PREGNANCY WITH HISTORY OF CESAREAN SECTION, ANTEPARTUM: ICD-10-CM

## 2025-08-26 PROCEDURE — 76805 OB US >/= 14 WKS SNGL FETUS: CPT | Performed by: RADIOLOGY

## 2025-08-26 PROCEDURE — 99207 PR PRENATAL VISIT: CPT | Performed by: OBSTETRICS & GYNECOLOGY

## (undated) DEVICE — ESU PENCIL SMOKE EVAC W/ROCKER SWITCH 0703-047-000

## (undated) DEVICE — SUCTION MANIFOLD NEPTUNE 2 SYS 4 PORT 0702-020-000

## (undated) DEVICE — SOL WATER IRRIG 1000ML BOTTLE 2F7114

## (undated) DEVICE — SU PLAIN 2-0 CT 27" 853H

## (undated) DEVICE — GLOVE BIOGEL PI SZ 8.0 40880

## (undated) DEVICE — PREP CHLORAPREP 26ML TINTED ORANGE  260815

## (undated) DEVICE — Device

## (undated) DEVICE — SOL NACL 0.9% INJ 1000ML BAG 07983-09

## (undated) DEVICE — SU MONOCRYL 0 CT-1 36" UND Y946H

## (undated) DEVICE — ESU GROUND PAD UNIVERSAL W/O CORD

## (undated) DEVICE — ESU WAND PROCISE XP LP COBLATION W/INT CABLE EICA8872-01

## (undated) DEVICE — SOL NACL 0.9% IRRIG 1000ML BOTTLE 07138-09

## (undated) DEVICE — GLOVE PROTEXIS W/NEU-THERA 7.5  2D73TE75

## (undated) DEVICE — STOCKING SLEEVE COMPRESSION CALF MED

## (undated) DEVICE — ADH SKIN CLOSURE PREMIERPRO EXOFIN 1.0ML 3470

## (undated) DEVICE — STPL SKIN SUBCUTICULAR INSORB 2025

## (undated) DEVICE — ESU LIGASURE OPEN SEALER/DIVIDER SM JAW 16.5MM LF1212A

## (undated) DEVICE — TUBING ESURG ENT SAL DISP 72290135

## (undated) DEVICE — CATH TRAY FOLEY 16FR DRAINAGE BAG STATLOCK 899916

## (undated) DEVICE — PACK T & A CUSTOM

## (undated) DEVICE — PACK C-SECTION

## (undated) RX ORDER — LIDOCAINE HCL/EPINEPHRINE/PF 2%-1:200K
VIAL (ML) INJECTION
Status: DISPENSED
Start: 2023-07-14

## (undated) RX ORDER — DEXAMETHASONE SODIUM PHOSPHATE 10 MG/ML
INJECTION, SOLUTION INTRAMUSCULAR; INTRAVENOUS
Status: DISPENSED
Start: 2024-04-23

## (undated) RX ORDER — OXYTOCIN/0.9 % SODIUM CHLORIDE 30/500 ML
PLASTIC BAG, INJECTION (ML) INTRAVENOUS
Status: DISPENSED
Start: 2023-07-14

## (undated) RX ORDER — PROPOFOL 10 MG/ML
INJECTION, EMULSION INTRAVENOUS
Status: DISPENSED
Start: 2023-07-14

## (undated) RX ORDER — MORPHINE SULFATE 1 MG/ML
INJECTION, SOLUTION EPIDURAL; INTRATHECAL; INTRAVENOUS
Status: DISPENSED
Start: 2023-07-14

## (undated) RX ORDER — OXYTOCIN 10 [USP'U]/ML
INJECTION, SOLUTION INTRAMUSCULAR; INTRAVENOUS
Status: DISPENSED
Start: 2023-07-14

## (undated) RX ORDER — FENTANYL CITRATE-0.9 % NACL/PF 10 MCG/ML
PLASTIC BAG, INJECTION (ML) INTRAVENOUS
Status: DISPENSED
Start: 2023-07-14

## (undated) RX ORDER — FENTANYL CITRATE 50 UG/ML
INJECTION, SOLUTION INTRAMUSCULAR; INTRAVENOUS
Status: DISPENSED
Start: 2024-04-23

## (undated) RX ORDER — LIDOCAINE HYDROCHLORIDE 10 MG/ML
INJECTION, SOLUTION EPIDURAL; INFILTRATION; INTRACAUDAL; PERINEURAL
Status: DISPENSED
Start: 2024-04-23

## (undated) RX ORDER — ONDANSETRON 2 MG/ML
INJECTION INTRAMUSCULAR; INTRAVENOUS
Status: DISPENSED
Start: 2024-04-23

## (undated) RX ORDER — FENTANYL CITRATE 50 UG/ML
INJECTION, SOLUTION INTRAMUSCULAR; INTRAVENOUS
Status: DISPENSED
Start: 2023-07-14